# Patient Record
Sex: FEMALE | Race: WHITE | Employment: OTHER | URBAN - METROPOLITAN AREA
[De-identification: names, ages, dates, MRNs, and addresses within clinical notes are randomized per-mention and may not be internally consistent; named-entity substitution may affect disease eponyms.]

---

## 2018-06-11 ENCOUNTER — HOSPITAL ENCOUNTER (EMERGENCY)
Facility: HOSPITAL | Age: 63
Discharge: HOME | End: 2018-06-11
Attending: EMERGENCY MEDICINE
Payer: MEDICARE

## 2018-06-11 VITALS
DIASTOLIC BLOOD PRESSURE: 70 MMHG | OXYGEN SATURATION: 97 % | WEIGHT: 143 LBS | RESPIRATION RATE: 18 BRPM | BODY MASS INDEX: 26.31 KG/M2 | HEIGHT: 62 IN | HEART RATE: 75 BPM | SYSTOLIC BLOOD PRESSURE: 140 MMHG | TEMPERATURE: 97 F

## 2018-06-11 DIAGNOSIS — N12 PYELONEPHRITIS: Primary | ICD-10-CM

## 2018-06-11 PROCEDURE — 87077 CULTURE AEROBIC IDENTIFY: CPT | Performed by: EMERGENCY MEDICINE

## 2018-06-11 PROCEDURE — 99283 EMERGENCY DEPT VISIT LOW MDM: CPT

## 2018-06-11 PROCEDURE — 87086 URINE CULTURE/COLONY COUNT: CPT | Performed by: EMERGENCY MEDICINE

## 2018-06-11 PROCEDURE — 36415 COLL VENOUS BLD VENIPUNCTURE: CPT

## 2018-06-11 PROCEDURE — 85025 COMPLETE CBC W/AUTO DIFF WBC: CPT | Performed by: EMERGENCY MEDICINE

## 2018-06-11 PROCEDURE — 87186 SC STD MICRODIL/AGAR DIL: CPT | Performed by: EMERGENCY MEDICINE

## 2018-06-11 PROCEDURE — 80053 COMPREHEN METABOLIC PANEL: CPT | Performed by: EMERGENCY MEDICINE

## 2018-06-11 PROCEDURE — 81001 URINALYSIS AUTO W/SCOPE: CPT | Performed by: EMERGENCY MEDICINE

## 2018-06-11 RX ORDER — PHENAZOPYRIDINE HYDROCHLORIDE 100 MG/1
TABLET, FILM COATED ORAL 3 TIMES DAILY PRN
Qty: 10 TABLET | Refills: 0 | Status: SHIPPED | OUTPATIENT
Start: 2018-06-11 | End: 2018-06-14

## 2018-06-11 RX ORDER — CEPHALEXIN 250 MG/1
250 CAPSULE ORAL 4 TIMES DAILY
Qty: 28 CAPSULE | Refills: 0 | Status: SHIPPED | OUTPATIENT
Start: 2018-06-11 | End: 2018-06-18

## 2018-06-11 RX ORDER — CEPHALEXIN 500 MG/1
500 CAPSULE ORAL ONCE
Status: COMPLETED | OUTPATIENT
Start: 2018-06-11 | End: 2018-06-11

## 2018-06-11 RX ORDER — ACETAMINOPHEN 500 MG
1000 TABLET ORAL ONCE
Status: COMPLETED | OUTPATIENT
Start: 2018-06-11 | End: 2018-06-11

## 2018-06-11 NOTE — ED PROVIDER NOTES
Patient Seen in: [facility] Emergency Department    History   Patient presents with:  Abdomen/Flank Pain (GI/)    Stated Complaint: PATIENT STATES KIDNEY PAIN    HPI    Patient is diabetic, has had previous urinary tract infections.   She can feel comfortable, no acute distress except mildly anxious  Head and neck: Normocephalic, atraumatic, pupils equal round and reactive to light, oropharynx clear   Neck: No JVD, no lymphadenopathy, no tenderness, swelling, deformity   Cardiovascular: Regular rate Final result                 Please view results for these tests on the individual orders. URINE CULTURE, ROUTINE       ED Course as of Jun 11 1502  ------------------------------------------------------------      MDM     We will check urinalysis.   Darnell Her

## 2018-06-11 NOTE — ED INITIAL ASSESSMENT (HPI)
Pt presents with uti sxs this morning.  She c/o right flank pain and describes a pressure sensation or the urgency to void, but is incapable of producing a normal stream.

## 2018-07-13 ENCOUNTER — OFFICE VISIT (OUTPATIENT)
Dept: INTERNAL MEDICINE CLINIC | Facility: CLINIC | Age: 63
End: 2018-07-13

## 2018-07-13 VITALS
HEART RATE: 77 BPM | BODY MASS INDEX: 26.68 KG/M2 | TEMPERATURE: 98 F | WEIGHT: 145 LBS | SYSTOLIC BLOOD PRESSURE: 128 MMHG | RESPIRATION RATE: 18 BRPM | DIASTOLIC BLOOD PRESSURE: 74 MMHG | HEIGHT: 62 IN

## 2018-07-13 DIAGNOSIS — E11.9 TYPE 2 DIABETES MELLITUS WITHOUT COMPLICATION, WITHOUT LONG-TERM CURRENT USE OF INSULIN (HCC): ICD-10-CM

## 2018-07-13 DIAGNOSIS — R74.8 ELEVATED LIVER ENZYMES: ICD-10-CM

## 2018-07-13 DIAGNOSIS — Z12.11 SCREENING FOR MALIGNANT NEOPLASM OF COLON: ICD-10-CM

## 2018-07-13 DIAGNOSIS — Z12.31 VISIT FOR SCREENING MAMMOGRAM: ICD-10-CM

## 2018-07-13 DIAGNOSIS — Z00.00 ENCOUNTER FOR ANNUAL HEALTH EXAMINATION: Primary | ICD-10-CM

## 2018-07-13 LAB
CARTRIDGE LOT#: 843 NUMERIC
HEMOGLOBIN A1C: 7.6 % (ref 4.3–5.6)

## 2018-07-13 PROCEDURE — 99203 OFFICE O/P NEW LOW 30 MIN: CPT | Performed by: FAMILY MEDICINE

## 2018-07-13 PROCEDURE — G0402 INITIAL PREVENTIVE EXAM: HCPCS | Performed by: FAMILY MEDICINE

## 2018-07-13 PROCEDURE — 83036 HEMOGLOBIN GLYCOSYLATED A1C: CPT | Performed by: FAMILY MEDICINE

## 2018-07-13 RX ORDER — GLIMEPIRIDE 2 MG/1
6 TABLET ORAL
Qty: 270 TABLET | Refills: 0 | Status: SHIPPED | OUTPATIENT
Start: 2018-07-13 | End: 2018-10-23

## 2018-07-13 NOTE — PROGRESS NOTES
HPI:   Nadia Baca is a 70year old female who presents for a Medicare Initial Preventative Physical Exam (Welcome to Medicare- < 12 months on Medicare). Here for annual check-up and to establish care.  Patient hasn't had health insurance for a f (diabetes mellitus) ([address]     Dyslipidemia     Chest pain     Type 2 diabetes mellitus without complication ([address]    Wt Readings from Last 3 Encounters:  07/13/18 : 145 lb  06/11/18 : 143 lb  06/08/15 : 143 lb 1.3 oz     Last Cholesterol Labs:     Lab Resu pain or ST  LUNGS: denies shortness of breath with exertion  CARDIOVASCULAR: denies chest pain on exertion  GI: denies abdominal pain, denies heartburn  : denies dysuria, vaginal discharge or itching, no complaint of urinary incontinence   MUSCULOSKELETA patient. ASSESSMENT AND OTHER RELEVANT CHRONIC CONDITIONS:   Fernando Sales is a 70year old female who presents for a Medicare Assessment.      PLAN SUMMARY:   Diagnoses and all orders for this visit:    Encounter for annual health examination    Ty HEMOGLOBIN A1C (%)   Date Value   07/13/2018 7.6 (A)    No flowsheet data found.     Fasting Blood Sugar (FSB)Annually   Glucose (mg/dL)   Date Value   06/11/2018 217 (H)   ----------  GLUCOSE (mg/dL)   Date Value   06/02/2013 153 (H)   ----------       Car Hepatitis B for Moderate/High Risk No vaccine history found Medium/high risk factors:   End-stage renal disease   Hemophiliacs who received Factor VIII or IX concentrates   Clients of institutions for the mentally retarded   Persons who live in the [facility]

## 2018-07-13 NOTE — PATIENT INSTRUCTIONS
David Figueroa's SCREENING SCHEDULE   Tests on this list are recommended by your physician but may not be covered, or covered at this frequency, by your insurer. Please check with your insurance carrier before scheduling to verify coverage.    Princess Decree patients who meet one of the following criteria:   • Men who are 73-68 years old and have smoked more than 100 cigarettes in their lifetime   • Anyone with a family history    Colorectal Cancer Screening  Covered up to Age 76     Colonoscopy Screen   Cover this or any previous visit. Please get every year    Pneumococcal 13 (Prevnar)  Covered Once after 65 No orders found for this or any previous visit.  Please get once after your 65th birthday    Pneumococcal 23 (Pneumovax)  Covered Once after 65 No orders f results. Have mammogram done.

## 2018-08-13 ENCOUNTER — HOSPITAL ENCOUNTER (OUTPATIENT)
Dept: MAMMOGRAPHY | Facility: HOSPITAL | Age: 63
Discharge: HOME | End: 2018-08-13
Attending: FAMILY MEDICINE
Payer: MEDICARE

## 2018-08-13 DIAGNOSIS — Z12.31 VISIT FOR SCREENING MAMMOGRAM: ICD-10-CM

## 2018-08-13 PROCEDURE — 77067 SCR MAMMO BI INCL CAD: CPT | Performed by: FAMILY MEDICINE

## 2018-08-13 PROCEDURE — 77063 BREAST TOMOSYNTHESIS BI: CPT | Performed by: FAMILY MEDICINE

## 2018-10-03 DIAGNOSIS — E11.9 TYPE 2 DIABETES MELLITUS WITHOUT COMPLICATION, WITHOUT LONG-TERM CURRENT USE OF INSULIN (HCC): ICD-10-CM

## 2018-10-03 NOTE — TELEPHONE ENCOUNTER
Please call patient. She is due for f/u office visit for diabetes- 30 mins. Let her know we will check her fingerstick A1c in the office. She doesn't need to fast for this.

## 2018-10-03 NOTE — TELEPHONE ENCOUNTER
E request  Medication(s) to Refill:   Requested Prescriptions     Pending Prescriptions Disp Refills   • METFORMIN HCL 1000 MG Oral Tab [Pharmacy Med Name: METFORMIN 1000MG TAB] 90 tablet 0     Sig: TAKE 1 TABLET BY MOUTH TWICE DAILY WITH MEALS         Shona Boas

## 2018-10-19 ENCOUNTER — APPOINTMENT (OUTPATIENT)
Dept: LAB | Age: 63
End: 2018-10-19
Attending: FAMILY MEDICINE
Payer: MEDICARE

## 2018-10-19 DIAGNOSIS — E11.9 TYPE 2 DIABETES MELLITUS WITHOUT COMPLICATION, WITHOUT LONG-TERM CURRENT USE OF INSULIN (HCC): ICD-10-CM

## 2018-10-19 DIAGNOSIS — E11.9 TYPE 2 DIABETES MELLITUS WITHOUT COMPLICATION, WITHOUT LONG-TERM CURRENT USE OF INSULIN (HCC): Primary | ICD-10-CM

## 2018-10-19 PROCEDURE — 83036 HEMOGLOBIN GLYCOSYLATED A1C: CPT

## 2018-10-19 PROCEDURE — 80053 COMPREHEN METABOLIC PANEL: CPT

## 2018-10-23 ENCOUNTER — OFFICE VISIT (OUTPATIENT)
Dept: INTERNAL MEDICINE CLINIC | Facility: CLINIC | Age: 63
End: 2018-10-23

## 2018-10-23 ENCOUNTER — TELEPHONE (OUTPATIENT)
Dept: INTERNAL MEDICINE CLINIC | Facility: CLINIC | Age: 63
End: 2018-10-23

## 2018-10-23 VITALS
RESPIRATION RATE: 16 BRPM | HEART RATE: 70 BPM | SYSTOLIC BLOOD PRESSURE: 124 MMHG | BODY MASS INDEX: 27 KG/M2 | DIASTOLIC BLOOD PRESSURE: 70 MMHG | WEIGHT: 145 LBS | TEMPERATURE: 98 F

## 2018-10-23 DIAGNOSIS — J02.9 PHARYNGITIS, UNSPECIFIED ETIOLOGY: ICD-10-CM

## 2018-10-23 DIAGNOSIS — E11.9 TYPE 2 DIABETES MELLITUS WITHOUT COMPLICATION, WITHOUT LONG-TERM CURRENT USE OF INSULIN (HCC): Primary | ICD-10-CM

## 2018-10-23 PROCEDURE — 87880 STREP A ASSAY W/OPTIC: CPT | Performed by: FAMILY MEDICINE

## 2018-10-23 PROCEDURE — 99214 OFFICE O/P EST MOD 30 MIN: CPT | Performed by: FAMILY MEDICINE

## 2018-10-23 RX ORDER — PIOGLITAZONEHYDROCHLORIDE 15 MG/1
15 TABLET ORAL DAILY
Qty: 90 TABLET | Refills: 0 | Status: SHIPPED | OUTPATIENT
Start: 2018-10-23 | End: 2018-10-24

## 2018-10-23 RX ORDER — GLIMEPIRIDE 2 MG/1
6 TABLET ORAL
Qty: 270 TABLET | Refills: 0 | Status: SHIPPED | OUTPATIENT
Start: 2018-10-23 | End: 2019-02-01

## 2018-10-23 NOTE — PATIENT INSTRUCTIONS
Start Metformin 1000mg in the morning, 500mg at noon, 500mg with dinner. Start Actos and take daily. Continue Glimepiride 6mg daily. Check your A1c in 3 months.

## 2018-10-23 NOTE — TELEPHONE ENCOUNTER
Called pharmacy to check if PA is required for Pioglitazone HCl 15 MG Oral Tab, per pharmacist it is going through insurance but 90 day supply still costs $285.34. Reports pt is in the donut hole right now.   AMS- do we have samples to provide to pt during

## 2018-10-23 NOTE — TELEPHONE ENCOUNTER
Pt was just seen today-AMS sent in rx for Pioglitazone HCl 15 MG Oral Tab and it cost $285-is there another med you can send in for her?  Call to advise

## 2018-10-24 NOTE — TELEPHONE ENCOUNTER
Called pt to advise that new rx - Januvia - was sent to replace Pioglitazone. Left VM (Ok per HIPAA) explaining and to call our office with any cost issues or questions about med.

## 2018-10-24 NOTE — TELEPHONE ENCOUNTER
Rx sent. Please call patient to let her know- and remind her to call us again if there are any issues with this med.

## 2018-10-25 ENCOUNTER — TELEPHONE (OUTPATIENT)
Dept: INTERNAL MEDICINE CLINIC | Facility: CLINIC | Age: 63
End: 2018-10-25

## 2018-10-25 NOTE — PROGRESS NOTES
HPI:    Patient ID: Lenny Courtney is a 70year old female. HPI Here for f/u on diabetes. Has been taking Metformin 1000mg BID and Glimepiride 6mg daily. States her morning sugars are \"always\" high. Has had sore throat for about 4 days. No fever. for activity change, appetite change, fatigue and fever. HENT: Negative for ear pain, hearing loss and rhinorrhea. Eyes: Negative for visual disturbance. Respiratory: Negative for cough and shortness of breath.     Cardiovascular: Negative for chest Normal range of motion. Neck supple. Cardiovascular: Normal rate, regular rhythm and normal heart sounds. Pulmonary/Chest: Effort normal and breath sounds normal.   Skin: Skin is warm and dry. Vitals reviewed.              ASSESSMENT/PLAN:   Type 2 di

## 2018-10-25 NOTE — TELEPHONE ENCOUNTER
Please call patient. We could try insulin- that would be covered by her insurance. Or there is still another oral med we could try that would likely be covered by her insurance.  It is important that we do something to get her blood sugars down since her cu

## 2018-10-25 NOTE — TELEPHONE ENCOUNTER
FYI to AMS;  AMS prescribed a medication that was $335 and so AMS sent in another medication which was $1300. Patient cancelled both at [facility]. Patient just wanted AMS to know that she is not getting the prescriptions and will be out of town.   She will

## 2018-10-29 NOTE — TELEPHONE ENCOUNTER
LMTCB whether she is interested in trying insulin or oral med for blood sugar control. Also sent [facility] message.

## 2018-11-02 NOTE — TELEPHONE ENCOUNTER
#2 VM - LMTCB to discuss if she wants to try insulin or different oral medication for blood sugar control.

## 2018-11-07 NOTE — TELEPHONE ENCOUNTER
AMS- ok to send letter with instructions since pt has been unreachable via mychart and phone? Please advise, thanks.

## 2018-11-09 NOTE — TELEPHONE ENCOUNTER
Certified letter mailed with AMS instructions to home address on file. FYI to AMS.     [address]

## 2018-11-28 ENCOUNTER — OFFICE VISIT (OUTPATIENT)
Dept: OBGYN CLINIC | Facility: CLINIC | Age: 63
End: 2018-11-28
Payer: MEDICARE

## 2018-11-28 VITALS
WEIGHT: 141 LBS | HEIGHT: 62 IN | SYSTOLIC BLOOD PRESSURE: 126 MMHG | BODY MASS INDEX: 25.95 KG/M2 | DIASTOLIC BLOOD PRESSURE: 72 MMHG | HEART RATE: 72 BPM

## 2018-11-28 DIAGNOSIS — Z01.419 WELL WOMAN EXAM WITH ROUTINE GYNECOLOGICAL EXAM: Primary | ICD-10-CM

## 2018-11-28 DIAGNOSIS — Z12.4 PAPANICOLAOU SMEAR FOR CERVICAL CANCER SCREENING: ICD-10-CM

## 2018-11-28 DIAGNOSIS — Z12.39 SCREENING FOR MALIGNANT NEOPLASM OF BREAST: ICD-10-CM

## 2018-11-28 PROCEDURE — G0438 PPPS, INITIAL VISIT: HCPCS | Performed by: OBSTETRICS & GYNECOLOGY

## 2018-11-28 NOTE — PROGRESS NOTES
Aly Aguilar is a 70year old female  No LMP recorded. Patient has had a hysterectomy. Patient presents with:  Gyn Problem: numbness and sharp pain along with pressure vaginal area  .      Had numbness secondary to diabetes however for 3 weeks s worry: Not on file      Food insecurity - inability: Not on file      Transportation needs - medical: Not on file      Transportation needs - non-medical: Not on file    Occupational History      Not on file    Tobacco Use      Smoking status: Never Smoker cyanosis  Psychiatric:  Oriented to time, place, person and situation. Appropriate mood and affect    Pelvic Exam:  External Genitalia: normal appearance, hair distribution, and no lesions no obvious hernias.   Urethral Meatus:  normal in size, location, wi

## 2018-12-06 ENCOUNTER — OFFICE VISIT (OUTPATIENT)
Dept: INTERNAL MEDICINE CLINIC | Facility: CLINIC | Age: 63
End: 2018-12-06
Payer: MEDICARE

## 2018-12-06 VITALS
RESPIRATION RATE: 16 BRPM | WEIGHT: 140 LBS | BODY MASS INDEX: 26 KG/M2 | SYSTOLIC BLOOD PRESSURE: 122 MMHG | HEART RATE: 70 BPM | TEMPERATURE: 98 F | DIASTOLIC BLOOD PRESSURE: 72 MMHG

## 2018-12-06 DIAGNOSIS — M89.9 PUBIC BONE PAIN: Primary | ICD-10-CM

## 2018-12-06 PROCEDURE — 99213 OFFICE O/P EST LOW 20 MIN: CPT | Performed by: FAMILY MEDICINE

## 2018-12-08 PROBLEM — Z01.419 WELL WOMAN EXAM WITH ROUTINE GYNECOLOGICAL EXAM: Status: RESOLVED | Noted: 2018-11-28 | Resolved: 2018-12-08

## 2018-12-08 NOTE — PROGRESS NOTES
HPI:    Patient ID: Darryle Spiro is a 70year old female. HPI Here with c/o pain at pubic bone for about one week. No injury. Worse with certain movements. Pain is getting better. Hasn't tried anything for this.      Review of Systems   Constitution

## 2018-12-10 ENCOUNTER — HOSPITAL ENCOUNTER (OUTPATIENT)
Dept: GENERAL RADIOLOGY | Age: 63
Discharge: HOME | End: 2018-12-10
Attending: FAMILY MEDICINE
Payer: MEDICARE

## 2018-12-10 DIAGNOSIS — M89.9 PUBIC BONE PAIN: ICD-10-CM

## 2018-12-10 PROCEDURE — 72190 X-RAY EXAM OF PELVIS: CPT | Performed by: FAMILY MEDICINE

## 2019-02-01 ENCOUNTER — OFFICE VISIT (OUTPATIENT)
Dept: INTERNAL MEDICINE CLINIC | Facility: CLINIC | Age: 64
End: 2019-02-01
Payer: MEDICARE

## 2019-02-01 VITALS
TEMPERATURE: 98 F | BODY MASS INDEX: 26.13 KG/M2 | WEIGHT: 142 LBS | DIASTOLIC BLOOD PRESSURE: 82 MMHG | SYSTOLIC BLOOD PRESSURE: 126 MMHG | RESPIRATION RATE: 16 BRPM | HEART RATE: 72 BPM | HEIGHT: 62 IN

## 2019-02-01 DIAGNOSIS — E11.9 TYPE 2 DIABETES MELLITUS WITHOUT COMPLICATION, WITHOUT LONG-TERM CURRENT USE OF INSULIN (HCC): Primary | ICD-10-CM

## 2019-02-01 LAB
CARTRIDGE EXPIRATION DATE: 2020 DATE
CARTRIDGE LOT#: 918 NUMERIC

## 2019-02-01 PROCEDURE — 83036 HEMOGLOBIN GLYCOSYLATED A1C: CPT | Performed by: FAMILY MEDICINE

## 2019-02-01 PROCEDURE — 99213 OFFICE O/P EST LOW 20 MIN: CPT | Performed by: FAMILY MEDICINE

## 2019-02-01 RX ORDER — GLIMEPIRIDE 2 MG/1
6 TABLET ORAL
Qty: 270 TABLET | Refills: 1 | Status: SHIPPED | OUTPATIENT
Start: 2019-02-01 | End: 2019-07-30

## 2019-02-01 NOTE — PROGRESS NOTES
HPI:    Patient ID: Shreyas Fenton is a 70year old female. HPI Here for f/u on diabetes. Has been trying to eat healthier and exercise regularly. Checks sugars periodically and sugars have improved. No complaints today.        Review of Systems   Con

## 2019-02-20 ENCOUNTER — TELEPHONE (OUTPATIENT)
Dept: INTERNAL MEDICINE CLINIC | Facility: CLINIC | Age: 64
End: 2019-02-20

## 2019-02-20 DIAGNOSIS — E11.9 TYPE 2 DIABETES MELLITUS WITHOUT COMPLICATION, WITHOUT LONG-TERM CURRENT USE OF INSULIN (HCC): Primary | ICD-10-CM

## 2019-02-20 RX ORDER — BLOOD-GLUCOSE METER
EACH MISCELLANEOUS
Qty: 1 KIT | Refills: 0 | Status: SHIPPED | OUTPATIENT
Start: 2019-02-20 | End: 2019-02-25

## 2019-02-20 RX ORDER — BLOOD SUGAR DIAGNOSTIC
STRIP MISCELLANEOUS
Qty: 100 STRIP | Refills: 1 | Status: SHIPPED | OUTPATIENT
Start: 2019-02-20 | End: 2019-02-25

## 2019-02-20 NOTE — TELEPHONE ENCOUNTER
Please call patient. She dropped off a note to send Rxs for a new Accucheck brand meter. Let her know I sent the Rxs- one for meter and lancets, and test strips.

## 2019-02-21 NOTE — TELEPHONE ENCOUNTER
LM for pt at [phone] (M)vm, new scripts sent in for Accucheck brand meter, lancets and test strips to [facility] on file and to call back if any questions.

## 2019-02-25 RX ORDER — BLOOD SUGAR DIAGNOSTIC
STRIP MISCELLANEOUS
Qty: 100 STRIP | Refills: 1 | Status: SHIPPED | OUTPATIENT
Start: 2019-02-25 | End: 2019-07-30

## 2019-02-25 RX ORDER — BLOOD-GLUCOSE METER
EACH MISCELLANEOUS
Qty: 1 KIT | Refills: 0 | Status: SHIPPED | OUTPATIENT
Start: 2019-02-25

## 2019-02-25 NOTE — TELEPHONE ENCOUNTER
Rodriguez Martinez from [place] called stated she received strips and lancets without dx. Medicare will not cover, will needs these 2 recent to include dx.  Please advise

## 2019-03-11 ENCOUNTER — TELEPHONE (OUTPATIENT)
Dept: INTERNAL MEDICINE CLINIC | Facility: CLINIC | Age: 64
End: 2019-03-11

## 2019-03-11 NOTE — TELEPHONE ENCOUNTER
Abstracted DM foot exam.  Results to [facility] bin for review. Then send to scanning.

## 2019-05-21 ENCOUNTER — TELEPHONE (OUTPATIENT)
Dept: INTERNAL MEDICINE CLINIC | Facility: CLINIC | Age: 64
End: 2019-05-21

## 2019-05-22 ENCOUNTER — TELEPHONE (OUTPATIENT)
Dept: INTERNAL MEDICINE CLINIC | Facility: CLINIC | Age: 64
End: 2019-05-22

## 2019-05-22 NOTE — TELEPHONE ENCOUNTER
Called pt left a VM to let her know she is due for eye exam and for physical to call back to let us know if she got it done yet or if she wants to schedule an appt for her medicare wellness.

## 2019-05-23 RX ORDER — LANCETS
1 EACH MISCELLANEOUS 2 TIMES DAILY
Qty: 200 EACH | Refills: 3 | Status: SHIPPED | OUTPATIENT
Start: 2019-05-23 | End: 2019-07-30

## 2019-05-23 NOTE — TELEPHONE ENCOUNTER
Prescription Refill Request - Patient advised can take 48-72 hours.     Name of Medication (strength, dose, qty requested:  Accuchek Fastclix     Which pharmacy:[facility]    Have we prescribed before:Yes  (If yes, please make explain to patient that future re

## 2019-05-23 NOTE — TELEPHONE ENCOUNTER
LOV: 2/1/19 w/ AMS for Diabetes  FOV: None  Last labs: 2/1/19 A1C  Last Refill: None    Per protocol routed to provider

## 2019-05-24 ENCOUNTER — TELEPHONE (OUTPATIENT)
Dept: INTERNAL MEDICINE CLINIC | Facility: CLINIC | Age: 64
End: 2019-05-24

## 2019-05-24 NOTE — TELEPHONE ENCOUNTER
Received DM eye exam from [facility] 3/4/19, no retinopathy. Abstracted results. Placed in AMS bin. Once reviewed, will send to scanning.

## 2019-06-18 ENCOUNTER — TELEPHONE (OUTPATIENT)
Dept: INTERNAL MEDICINE CLINIC | Facility: CLINIC | Age: 64
End: 2019-06-18

## 2019-06-18 NOTE — TELEPHONE ENCOUNTER
Spoke with patient. Patient states she is doing very well with her DM. Patient will call in July to schedule her Medicare Wellness.

## 2019-07-30 ENCOUNTER — OFFICE VISIT (OUTPATIENT)
Dept: INTERNAL MEDICINE CLINIC | Facility: CLINIC | Age: 64
End: 2019-07-30
Payer: MEDICARE

## 2019-07-30 VITALS
OXYGEN SATURATION: 98 % | SYSTOLIC BLOOD PRESSURE: 130 MMHG | BODY MASS INDEX: 25.91 KG/M2 | DIASTOLIC BLOOD PRESSURE: 74 MMHG | WEIGHT: 140.81 LBS | RESPIRATION RATE: 20 BRPM | TEMPERATURE: 99 F | HEART RATE: 76 BPM | HEIGHT: 62 IN

## 2019-07-30 DIAGNOSIS — Z12.31 VISIT FOR SCREENING MAMMOGRAM: ICD-10-CM

## 2019-07-30 DIAGNOSIS — E11.9 TYPE 2 DIABETES MELLITUS WITHOUT COMPLICATION, WITHOUT LONG-TERM CURRENT USE OF INSULIN (HCC): Primary | ICD-10-CM

## 2019-07-30 DIAGNOSIS — E78.5 DYSLIPIDEMIA: ICD-10-CM

## 2019-07-30 DIAGNOSIS — R74.8 ELEVATED LIVER ENZYMES: ICD-10-CM

## 2019-07-30 LAB
CARTRIDGE EXPIRATION DATE: 2021 DATE
CARTRIDGE LOT#: 981 NUMERIC
HEMOGLOBIN A1C: 6.9 % (ref 4.3–5.6)

## 2019-07-30 PROCEDURE — 99214 OFFICE O/P EST MOD 30 MIN: CPT | Performed by: FAMILY MEDICINE

## 2019-07-30 PROCEDURE — 83036 HEMOGLOBIN GLYCOSYLATED A1C: CPT | Performed by: FAMILY MEDICINE

## 2019-07-30 RX ORDER — GLIMEPIRIDE 2 MG/1
4 TABLET ORAL
Qty: 180 TABLET | Refills: 1 | Status: SHIPPED | OUTPATIENT
Start: 2019-07-30 | End: 2020-01-10

## 2019-07-30 RX ORDER — BLOOD SUGAR DIAGNOSTIC
STRIP MISCELLANEOUS
Qty: 100 STRIP | Refills: 1 | Status: SHIPPED | OUTPATIENT
Start: 2019-07-30 | End: 2020-01-16

## 2019-07-30 RX ORDER — LANCETS
1 EACH MISCELLANEOUS 2 TIMES DAILY
Qty: 200 EACH | Refills: 3 | Status: SHIPPED | OUTPATIENT
Start: 2019-07-30 | End: 2020-04-01

## 2019-07-30 RX ORDER — BIOTIN 1 MG
1 TABLET ORAL DAILY
COMMUNITY

## 2019-07-30 NOTE — PATIENT INSTRUCTIONS
Get your blood tests done. This will need to be done fasting for about 8 hours prior and will include a urine test. We will call you once we get the results. Go ahead and decrease your Glimepiride to 4mg in the morning.      Exercise to Manage Your Bloo A pedometer is a small device that keeps track of how many steps you take. You can clip it to your belt (or a strap on your arm or leg) and go about your daily routine.  \"Smartphones\" now also have apps to record your walking. At the end of the day, the amber Physical activity is important when you have diabetes. But you need to keep an eye on your blood sugar level. Check often if you have been active for longer than usual, or if the activity was unplanned.  Make it a habit to check your blood sugar before bein

## 2019-07-30 NOTE — PROGRESS NOTES
HPI:    Patient ID: Lenny Courtney is a 75year old female. HPI  Here for f/u on diabetes. Patient has been continuing to check her blood sugars a couple of times per day.  Does note fasting sugars can be in the 60s sometimes and is wondering if she c VOMITING  Ace Inhibitors          Coughing  Prednisone              RASH    Comment:Pt. States her left arm and hand became numb  Sulfa Antibiotics       RASH   PHYSICAL EXAM:   Physical Exam   Constitutional: She appears well-developed and well-nourished.

## 2019-08-15 NOTE — TELEPHONE ENCOUNTER
Patient called back re: phone call to her to set up her Medicare Wellness. Patient states that the last visit in July she and Dr. Rubina Muñiz discussed all that needed to be discussed.   Patient wants to know if she really has to come in again for a medicare

## 2019-08-15 NOTE — TELEPHONE ENCOUNTER
LOV 7/30/19 with AMS. Copied ov notes:      ASSESSMENT/PLAN:   Type 2 diabetes mellitus without complication, without long-term current use of insulin (hcc)  (primary encounter diagnosis)  Dyslipidemia  Elevated liver enzymes  1.  A1c at goal. [address] [address]

## 2019-08-16 NOTE — TELEPHONE ENCOUNTER
Called pt back. Left detailed message (Virgen Drew per HIPAA) informing pt just to complete ordered labs from July and to fast 10-12 hrs beforehand. No appt is necessary. Advised pt to call office with any questions.

## 2019-08-23 ENCOUNTER — APPOINTMENT (OUTPATIENT)
Dept: LAB | Age: 64
End: 2019-08-23
Attending: FAMILY MEDICINE
Payer: MEDICARE

## 2019-08-23 DIAGNOSIS — E78.5 DYSLIPIDEMIA: ICD-10-CM

## 2019-08-23 DIAGNOSIS — R74.8 ELEVATED LIVER ENZYMES: ICD-10-CM

## 2019-08-23 DIAGNOSIS — E11.9 TYPE 2 DIABETES MELLITUS WITHOUT COMPLICATION, WITHOUT LONG-TERM CURRENT USE OF INSULIN (HCC): ICD-10-CM

## 2019-08-23 LAB
ALBUMIN SERPL-MCNC: 3.6 G/DL (ref 3.4–5)
ALBUMIN/GLOB SERPL: 1.1 {RATIO} (ref 1–2)
ALP LIVER SERPL-CCNC: 70 U/L (ref 55–142)
ALT SERPL-CCNC: 32 U/L (ref 13–56)
ANION GAP SERPL CALC-SCNC: 6 MMOL/L (ref 0–18)
AST SERPL-CCNC: 23 U/L (ref 15–37)
BILIRUB SERPL-MCNC: 0.6 MG/DL (ref 0.1–2)
BUN BLD-MCNC: 9 MG/DL (ref 7–18)
BUN/CREAT SERPL: 11.5 (ref 10–20)
CALCIUM BLD-MCNC: 8.7 MG/DL (ref 8.5–10.1)
CHLORIDE SERPL-SCNC: 108 MMOL/L (ref 98–112)
CHOLEST SMN-MCNC: 241 MG/DL (ref ?–200)
CO2 SERPL-SCNC: 28 MMOL/L (ref 21–32)
CREAT BLD-MCNC: 0.78 MG/DL (ref 0.55–1.02)
CREAT UR-SCNC: 140 MG/DL
GLOBULIN PLAS-MCNC: 3.2 G/DL (ref 2.8–4.4)
GLUCOSE BLD-MCNC: 160 MG/DL (ref 70–99)
HDLC SERPL-MCNC: 45 MG/DL (ref 40–59)
LDLC SERPL CALC-MCNC: 172 MG/DL (ref ?–100)
M PROTEIN MFR SERPL ELPH: 6.8 G/DL (ref 6.4–8.2)
MICROALBUMIN UR-MCNC: 1.03 MG/DL
MICROALBUMIN/CREAT 24H UR-RTO: 7.4 UG/MG (ref ?–30)
NONHDLC SERPL-MCNC: 196 MG/DL (ref ?–130)
OSMOLALITY SERPL CALC.SUM OF ELEC: 296 MOSM/KG (ref 275–295)
POTASSIUM SERPL-SCNC: 4.7 MMOL/L (ref 3.5–5.1)
SODIUM SERPL-SCNC: 142 MMOL/L (ref 136–145)
TRIGL SERPL-MCNC: 121 MG/DL (ref 30–149)
VLDLC SERPL CALC-MCNC: 24 MG/DL (ref 0–30)

## 2019-08-23 PROCEDURE — 80061 LIPID PANEL: CPT

## 2019-08-23 PROCEDURE — 82570 ASSAY OF URINE CREATININE: CPT

## 2019-08-23 PROCEDURE — 82043 UR ALBUMIN QUANTITATIVE: CPT

## 2019-08-23 PROCEDURE — 80053 COMPREHEN METABOLIC PANEL: CPT

## 2019-08-30 ENCOUNTER — HOSPITAL ENCOUNTER (OUTPATIENT)
Dept: MAMMOGRAPHY | Age: 64
Discharge: HOME | End: 2019-08-30
Attending: FAMILY MEDICINE
Payer: MEDICARE

## 2019-08-30 DIAGNOSIS — Z12.31 VISIT FOR SCREENING MAMMOGRAM: ICD-10-CM

## 2019-08-30 PROCEDURE — 77067 SCR MAMMO BI INCL CAD: CPT | Performed by: FAMILY MEDICINE

## 2019-08-30 PROCEDURE — 77063 BREAST TOMOSYNTHESIS BI: CPT | Performed by: FAMILY MEDICINE

## 2019-10-01 ENCOUNTER — TELEPHONE (OUTPATIENT)
Dept: INTERNAL MEDICINE CLINIC | Facility: CLINIC | Age: 64
End: 2019-10-01

## 2019-10-06 ENCOUNTER — HOSPITAL ENCOUNTER (EMERGENCY)
Facility: HOSPITAL | Age: 64
Discharge: HOME | End: 2019-10-06
Attending: EMERGENCY MEDICINE
Payer: MEDICARE

## 2019-10-06 VITALS
HEART RATE: 75 BPM | TEMPERATURE: 99 F | WEIGHT: 133 LBS | HEIGHT: 62 IN | BODY MASS INDEX: 24.48 KG/M2 | RESPIRATION RATE: 17 BRPM | DIASTOLIC BLOOD PRESSURE: 79 MMHG | SYSTOLIC BLOOD PRESSURE: 130 MMHG | OXYGEN SATURATION: 98 %

## 2019-10-06 DIAGNOSIS — B02.9 HERPES ZOSTER WITHOUT COMPLICATION: Primary | ICD-10-CM

## 2019-10-06 PROCEDURE — 99283 EMERGENCY DEPT VISIT LOW MDM: CPT

## 2019-10-06 RX ORDER — PREDNISONE 1 MG/1
TABLET ORAL
Qty: 13 TABLET | Refills: 0 | Status: SHIPPED | OUTPATIENT
Start: 2019-10-06 | End: 2019-12-20

## 2019-10-06 RX ORDER — FAMCICLOVIR 500 MG/1
500 TABLET, FILM COATED ORAL 3 TIMES DAILY
Qty: 21 TABLET | Refills: 0 | Status: SHIPPED | OUTPATIENT
Start: 2019-10-06 | End: 2019-10-13

## 2019-10-06 RX ORDER — PREDNISONE 20 MG/1
40 TABLET ORAL DAILY
Qty: 10 TABLET | Refills: 0 | Status: SHIPPED | OUTPATIENT
Start: 2019-10-06 | End: 2019-10-11

## 2019-10-07 NOTE — ED PROVIDER NOTES
Patient Seen in: [facility] Emergency Department      History   Patient presents with:  Pain (neurologic)    Stated Complaint: L leg pain     HPI    Patient complains of leg pain that started yesterday.   Patient describes an area of burning discomfo (Room air)       Current:/79   Pulse 75   Temp 98.5 °F (36.9 °C) (Temporal)   Resp 17   Ht 157.5 cm (5' 2\")   Wt 60.3 kg   SpO2 98%   BMI 24.33 kg/m²         Physical Exam  General: The patient is awake, alert, conversant.   Patient answers questions outbreak.       Disposition and Plan     Clinical Impression:  Herpes zoster without complication  (primary encounter diagnosis)    Disposition:  Discharge  10/6/2019  9:02 pm    Follow-up:  Chelo Barrett, DO  [address], [address] [facility]

## 2019-10-07 NOTE — ED INITIAL ASSESSMENT (HPI)
Working in garage and felt a sting in her left buttox radiating across and down the back of her leg. Pain has been worsening X 3 days.

## 2019-10-11 ENCOUNTER — OFFICE VISIT (OUTPATIENT)
Dept: INTERNAL MEDICINE CLINIC | Facility: CLINIC | Age: 64
End: 2019-10-11
Payer: MEDICARE

## 2019-10-11 VITALS
RESPIRATION RATE: 16 BRPM | WEIGHT: 137.5 LBS | TEMPERATURE: 98 F | HEIGHT: 62 IN | SYSTOLIC BLOOD PRESSURE: 136 MMHG | BODY MASS INDEX: 25.3 KG/M2 | HEART RATE: 82 BPM | DIASTOLIC BLOOD PRESSURE: 68 MMHG

## 2019-10-11 DIAGNOSIS — B02.9 HERPES ZOSTER WITHOUT COMPLICATION: Primary | ICD-10-CM

## 2019-10-11 DIAGNOSIS — E78.5 DYSLIPIDEMIA: ICD-10-CM

## 2019-10-11 DIAGNOSIS — E11.9 TYPE 2 DIABETES MELLITUS WITHOUT COMPLICATION, WITHOUT LONG-TERM CURRENT USE OF INSULIN (HCC): ICD-10-CM

## 2019-10-11 PROCEDURE — 99214 OFFICE O/P EST MOD 30 MIN: CPT | Performed by: FAMILY MEDICINE

## 2019-10-14 NOTE — PROGRESS NOTES
HPI:    Patient ID: Claudia Mcbride is a 75year old female. HPI Here for f/u from ER where she was diagnosed with shingles. Patient started to have pain in left thigh about 8 days ago.  Went to ER and at that point had some lesions and started on anti glimepiride 2 MG Oral Tab, Take 2 tablets (4 mg total) by mouth daily with breakfast., Disp: 180 tablet, Rfl: 1  ACCU-CHEK FASTCLIX LANCETS Does not apply Misc, 1 lancet by Finger stick route 2 (two) times daily.  Use as directed., Disp: 200 each, Rfl: 3  G None       [id number]

## 2019-12-20 ENCOUNTER — OFFICE VISIT (OUTPATIENT)
Dept: INTERNAL MEDICINE CLINIC | Facility: CLINIC | Age: 64
End: 2019-12-20
Payer: MEDICARE

## 2019-12-20 VITALS
HEIGHT: 62 IN | WEIGHT: 138.25 LBS | SYSTOLIC BLOOD PRESSURE: 136 MMHG | RESPIRATION RATE: 16 BRPM | DIASTOLIC BLOOD PRESSURE: 68 MMHG | TEMPERATURE: 99 F | HEART RATE: 72 BPM | BODY MASS INDEX: 25.44 KG/M2

## 2019-12-20 DIAGNOSIS — Z12.11 SCREENING FOR MALIGNANT NEOPLASM OF COLON: ICD-10-CM

## 2019-12-20 DIAGNOSIS — E11.9 TYPE 2 DIABETES MELLITUS WITHOUT COMPLICATION, WITHOUT LONG-TERM CURRENT USE OF INSULIN (HCC): Primary | ICD-10-CM

## 2019-12-20 DIAGNOSIS — E78.5 DYSLIPIDEMIA: ICD-10-CM

## 2019-12-20 PROCEDURE — 83036 HEMOGLOBIN GLYCOSYLATED A1C: CPT | Performed by: FAMILY MEDICINE

## 2019-12-20 PROCEDURE — 99214 OFFICE O/P EST MOD 30 MIN: CPT | Performed by: FAMILY MEDICINE

## 2019-12-20 NOTE — PATIENT INSTRUCTIONS
Continue current medication. Recheck your cholesterol in January and we can then talk about starting cholesterol lowering medication.

## 2019-12-22 NOTE — PROGRESS NOTES
HPI:    Patient ID: Fernando Sales is a 75year old female. HPI Here for f/u. Patient has been taking Metformin and Glimepiride as prescribed with no issues. Is very reluctant to take prescription medication and feels supplements are safer.  Is taking AND VOMITING  Ace Inhibitors          Coughing  Prednisone              RASH    Comment:Pt.  States her left arm and hand became numb  Sulfa Antibiotics       RASH   PHYSICAL EXAM:   Physical Exam   Constitutional: She appears well-developed and well-nouris

## 2020-01-07 ENCOUNTER — OFFICE VISIT (OUTPATIENT)
Dept: FAMILY MEDICINE CLINIC | Facility: CLINIC | Age: 65
End: 2020-01-07
Payer: MEDICARE

## 2020-01-07 VITALS
HEART RATE: 70 BPM | TEMPERATURE: 98 F | WEIGHT: 133 LBS | HEIGHT: 62 IN | DIASTOLIC BLOOD PRESSURE: 80 MMHG | RESPIRATION RATE: 16 BRPM | SYSTOLIC BLOOD PRESSURE: 120 MMHG | OXYGEN SATURATION: 99 % | BODY MASS INDEX: 24.48 KG/M2

## 2020-01-07 DIAGNOSIS — J02.9 PHARYNGITIS, UNSPECIFIED ETIOLOGY: Primary | ICD-10-CM

## 2020-01-07 LAB
CONTROL LINE PRESENT WITH A CLEAR BACKGROUND (YES/NO): YES YES/NO
KIT LOT #: NORMAL NUMERIC
STREP GRP A CUL-SCR: NEGATIVE

## 2020-01-07 PROCEDURE — 87081 CULTURE SCREEN ONLY: CPT | Performed by: NURSE PRACTITIONER

## 2020-01-07 PROCEDURE — 99213 OFFICE O/P EST LOW 20 MIN: CPT | Performed by: NURSE PRACTITIONER

## 2020-01-07 PROCEDURE — 87880 STREP A ASSAY W/OPTIC: CPT | Performed by: NURSE PRACTITIONER

## 2020-01-07 NOTE — PROGRESS NOTES
CHIEF COMPLAINT:   Patient presents with:  Sore Throat: x 5 days        HPI:   Rob Arguello is a 75year old female presents to clinic with complaint of sore throat. Patient has had 5 days. Symptoms have been worsening since onset.   Patient reports Smoking status: Never Smoker      Smokeless tobacco: Never Used    Alcohol use: No    Drug use: No       REVIEW OF SYSTEMS:   GENERAL HEALTH: fair appetite  SKIN: denies any unusual skin lesions or rashes  HEENT: denies ear pain, See HPI  RESPIRATORY: Requested Prescriptions      No prescriptions requested or ordered in this encounter       Throat culture to be sent: yes      Comfort measures explained and discussed as listed in Patient Instructions    Follow up with PCP in 3-5 days if not improving, co Prevention tips include the following:  · Stop smoking or reduce contact with secondhand smoke. Smoke irritates the tender throat lining. · Limit contact with pets and with allergy-causing substances, such as pollen and mold.   · When you’re around someone

## 2020-01-10 DIAGNOSIS — E11.9 TYPE 2 DIABETES MELLITUS WITHOUT COMPLICATION, WITHOUT LONG-TERM CURRENT USE OF INSULIN (HCC): ICD-10-CM

## 2020-01-10 RX ORDER — GLIMEPIRIDE 2 MG/1
TABLET ORAL
Qty: 180 TABLET | Refills: 0 | Status: SHIPPED | OUTPATIENT
Start: 2020-01-10 | End: 2020-04-01

## 2020-01-10 NOTE — TELEPHONE ENCOUNTER
LOV: 12/20/19-dm  Last CPE:7/13/18  Last refill  metFORMIN HCl 500 MG Oral Tab 360 tablet 1 7/30/2019     glimepiride 2 MG Oral Tab 180 tablet 1 7/30/2019     Last labs that are related: 12/20/19-a1c  Future appointment: None  Protocol: passed protocol; pe

## 2020-01-16 DIAGNOSIS — E11.9 TYPE 2 DIABETES MELLITUS WITHOUT COMPLICATION, WITHOUT LONG-TERM CURRENT USE OF INSULIN (HCC): ICD-10-CM

## 2020-01-16 RX ORDER — BLOOD SUGAR DIAGNOSTIC
STRIP MISCELLANEOUS
Qty: 100 STRIP | Refills: 1 | Status: SHIPPED | OUTPATIENT
Start: 2020-01-16 | End: 2020-04-01

## 2020-01-16 NOTE — TELEPHONE ENCOUNTER
LOV: 12/20/19-dm  Last CPE:7/13/18  Last refill:Glucose Blood (ACCU-CHEK GUIDE) In Vitro Strip 7/30/19  Quantity: 100 strips, 1 refills  Last labs that are related: a1c 12/20/19  Future appointment: None  Protocol: passed protocol; pend    Please approve o

## 2020-01-17 ENCOUNTER — TELEPHONE (OUTPATIENT)
Dept: INTERNAL MEDICINE CLINIC | Facility: CLINIC | Age: 65
End: 2020-01-17

## 2020-01-17 DIAGNOSIS — E11.9 TYPE 2 DIABETES MELLITUS WITHOUT COMPLICATION, WITHOUT LONG-TERM CURRENT USE OF INSULIN (HCC): ICD-10-CM

## 2020-01-17 NOTE — TELEPHONE ENCOUNTER
Fax received from [facility] requesting   Accu-chek fastclix lancets  Accu-chek guide with DX CODE    Spoke to Dilcia Meade- she cleared and updated the noted.   DX code E11.9 pt non insulin dependent

## 2020-02-13 ENCOUNTER — TELEPHONE (OUTPATIENT)
Dept: INTERNAL MEDICINE CLINIC | Facility: CLINIC | Age: 65
End: 2020-02-13

## 2020-04-01 DIAGNOSIS — E11.9 TYPE 2 DIABETES MELLITUS WITHOUT COMPLICATION, WITHOUT LONG-TERM CURRENT USE OF INSULIN (HCC): ICD-10-CM

## 2020-04-01 RX ORDER — BLOOD SUGAR DIAGNOSTIC
STRIP MISCELLANEOUS
Qty: 100 STRIP | Refills: 1 | Status: SHIPPED | OUTPATIENT
Start: 2020-04-01 | End: 2020-10-14

## 2020-04-01 RX ORDER — GLIMEPIRIDE 2 MG/1
4 TABLET ORAL
Qty: 180 TABLET | Refills: 0 | Status: SHIPPED | OUTPATIENT
Start: 2020-04-01 | End: 2020-05-22

## 2020-04-01 RX ORDER — LANCETS
1 EACH MISCELLANEOUS 2 TIMES DAILY
Qty: 200 EACH | Refills: 3 | Status: SHIPPED | OUTPATIENT
Start: 2020-04-01 | End: 2020-10-14

## 2020-04-01 NOTE — TELEPHONE ENCOUNTER
Prescription Refill Request - Patient advised can take 48-72 hours.       Name of Medication (strength, dose, qty requested:   metFORMIN HCl 500 MG Oral Tab 360 tablet 0 1/10/2020    Sig:   TAKE 2 TABLETS BY MOUTH IN THE MORNING , one tablet at noon and one

## 2020-04-01 NOTE — TELEPHONE ENCOUNTER
Last Ov: 12/20/19, AMS, DM f/u  Upcoming appt: 5/22/20, TB  Last labs: A1c 12/20/19  Last Rx:   Lancets, #200, 3R 7/30/19  Glimepiride 2mg, #180, 0R 1/10/20  Test strip, #100, 1R 1/16/20  Metformin 500mg, #360, 0R 1/10/20

## 2020-05-22 ENCOUNTER — VIRTUAL PHONE E/M (OUTPATIENT)
Dept: INTERNAL MEDICINE CLINIC | Facility: CLINIC | Age: 65
End: 2020-05-22
Payer: MEDICARE

## 2020-05-22 DIAGNOSIS — Z12.31 ENCOUNTER FOR SCREENING MAMMOGRAM FOR MALIGNANT NEOPLASM OF BREAST: ICD-10-CM

## 2020-05-22 DIAGNOSIS — E78.5 DYSLIPIDEMIA: ICD-10-CM

## 2020-05-22 DIAGNOSIS — E11.9 TYPE 2 DIABETES MELLITUS WITHOUT COMPLICATION, WITHOUT LONG-TERM CURRENT USE OF INSULIN (HCC): ICD-10-CM

## 2020-05-22 DIAGNOSIS — E78.5 MILD HYPERLIPIDEMIA: ICD-10-CM

## 2020-05-22 DIAGNOSIS — Z86.39 HISTORY OF DIABETIC NEUROPATHY: ICD-10-CM

## 2020-05-22 DIAGNOSIS — R63.4 WEIGHT LOSS: ICD-10-CM

## 2020-05-22 DIAGNOSIS — Z12.83 SCREENING FOR SKIN CANCER: ICD-10-CM

## 2020-05-22 DIAGNOSIS — E11.649 CONTROLLED TYPE 2 DIABETES MELLITUS WITH HYPOGLYCEMIA, WITHOUT LONG-TERM CURRENT USE OF INSULIN (HCC): Primary | ICD-10-CM

## 2020-05-22 DIAGNOSIS — R92.2 DENSE BREAST: ICD-10-CM

## 2020-05-22 PROCEDURE — 99443 PHONE E/M BY PHYS 21-30 MIN: CPT | Performed by: INTERNAL MEDICINE

## 2020-05-22 RX ORDER — GLIMEPIRIDE 2 MG/1
2 TABLET ORAL
Qty: 180 TABLET | Refills: 0 | COMMUNITY
Start: 2020-05-22 | End: 2020-06-30

## 2020-05-22 NOTE — PROGRESS NOTES
Due to COVID-19 ACTION PLAN, the patient's office visit was converted to a phone or video visit. Patient understands and accepts financial responsibility for any deductible, co-insurance and/or co-pays associated with this service.   Time Spent:22 min    S BILAT (CPT=77067/97381); Future    Dense breast  -     CATA MACK 2D+3D SCREENING BILAT (CPT=77067/66752);  Future    Screening for skin cancer-     DERM - INTERNAL    History of diabetic neuropathy  -     PODIATRY - INTERNAL         Return in about 6 weeks (

## 2020-06-01 ENCOUNTER — VIRTUAL PHONE E/M (OUTPATIENT)
Dept: INTERNAL MEDICINE CLINIC | Facility: CLINIC | Age: 65
End: 2020-06-01
Payer: MEDICARE

## 2020-06-01 DIAGNOSIS — Z91.09 ENVIRONMENTAL ALLERGIES: ICD-10-CM

## 2020-06-01 DIAGNOSIS — H92.03 EAR PAIN, BILATERAL: ICD-10-CM

## 2020-06-01 DIAGNOSIS — J02.9 SORE THROAT: Primary | ICD-10-CM

## 2020-06-01 PROCEDURE — 99441 PHONE E/M BY PHYS 5-10 MIN: CPT | Performed by: INTERNAL MEDICINE

## 2020-06-01 RX ORDER — AZITHROMYCIN 250 MG/1
TABLET, FILM COATED ORAL
Qty: 6 TABLET | Refills: 0 | Status: SHIPPED | OUTPATIENT
Start: 2020-06-01 | End: 2020-06-06

## 2020-06-01 NOTE — PROGRESS NOTES
Due to COVID-19 ACTION PLAN, the patient's office visit was converted to a phone visit. Patient understands and accepts financial responsibility for any deductible, co-insurance and/or co-pays associated with this service.   Time Spent: 8 min    Subjective relationship, due to the on-going public health crisis/national emergency and because of restrictions of visitation. There are limitations of this visit as no physical exam could be performed.   Every conscious effort was taken to allow for sufficient and

## 2020-06-29 ENCOUNTER — LAB ENCOUNTER (OUTPATIENT)
Dept: LAB | Age: 65
End: 2020-06-29
Attending: INTERNAL MEDICINE
Payer: MEDICARE

## 2020-06-29 DIAGNOSIS — E78.5 DYSLIPIDEMIA: ICD-10-CM

## 2020-06-29 DIAGNOSIS — E11.649 CONTROLLED TYPE 2 DIABETES MELLITUS WITH HYPOGLYCEMIA, WITHOUT LONG-TERM CURRENT USE OF INSULIN (HCC): ICD-10-CM

## 2020-06-29 DIAGNOSIS — R63.4 WEIGHT LOSS: ICD-10-CM

## 2020-06-29 LAB
ALBUMIN SERPL-MCNC: 3.9 G/DL (ref 3.4–5)
ALBUMIN/GLOB SERPL: 1.1 {RATIO} (ref 1–2)
ALP LIVER SERPL-CCNC: 59 U/L (ref 55–142)
ALT SERPL-CCNC: 42 U/L (ref 13–56)
ANION GAP SERPL CALC-SCNC: 7 MMOL/L (ref 0–18)
AST SERPL-CCNC: 21 U/L (ref 15–37)
BASOPHILS # BLD AUTO: 0.03 X10(3) UL (ref 0–0.2)
BASOPHILS NFR BLD AUTO: 0.5 %
BILIRUB SERPL-MCNC: 0.6 MG/DL (ref 0.1–2)
BUN BLD-MCNC: 13 MG/DL (ref 7–18)
BUN/CREAT SERPL: 14.8 (ref 10–20)
CALCIUM BLD-MCNC: 9.3 MG/DL (ref 8.5–10.1)
CHLORIDE SERPL-SCNC: 109 MMOL/L (ref 98–112)
CHOLEST SMN-MCNC: 220 MG/DL (ref ?–200)
CO2 SERPL-SCNC: 25 MMOL/L (ref 21–32)
CREAT BLD-MCNC: 0.88 MG/DL (ref 0.55–1.02)
CREAT UR-SCNC: 149 MG/DL
DEPRECATED RDW RBC AUTO: 41.2 FL (ref 35.1–46.3)
EOSINOPHIL # BLD AUTO: 0.06 X10(3) UL (ref 0–0.7)
EOSINOPHIL NFR BLD AUTO: 1.1 %
ERYTHROCYTE [DISTWIDTH] IN BLOOD BY AUTOMATED COUNT: 12.7 % (ref 11–15)
EST. AVERAGE GLUCOSE BLD GHB EST-MCNC: 148 MG/DL (ref 68–126)
GLOBULIN PLAS-MCNC: 3.5 G/DL (ref 2.8–4.4)
GLUCOSE BLD-MCNC: 146 MG/DL (ref 70–99)
HBA1C MFR BLD HPLC: 6.8 % (ref ?–5.7)
HCT VFR BLD AUTO: 43.8 % (ref 35–48)
HDLC SERPL-MCNC: 49 MG/DL (ref 40–59)
HGB BLD-MCNC: 14 G/DL (ref 12–16)
IMM GRANULOCYTES # BLD AUTO: 0.02 X10(3) UL (ref 0–1)
IMM GRANULOCYTES NFR BLD: 0.4 %
LDLC SERPL CALC-MCNC: 155 MG/DL (ref ?–100)
LYMPHOCYTES # BLD AUTO: 1.99 X10(3) UL (ref 1–4)
LYMPHOCYTES NFR BLD AUTO: 35.2 %
M PROTEIN MFR SERPL ELPH: 7.4 G/DL (ref 6.4–8.2)
MCH RBC QN AUTO: 28.9 PG (ref 26–34)
MCHC RBC AUTO-ENTMCNC: 32 G/DL (ref 31–37)
MCV RBC AUTO: 90.5 FL (ref 80–100)
MICROALBUMIN UR-MCNC: 1.87 MG/DL
MICROALBUMIN/CREAT 24H UR-RTO: 12.6 UG/MG (ref ?–30)
MONOCYTES # BLD AUTO: 0.4 X10(3) UL (ref 0.1–1)
MONOCYTES NFR BLD AUTO: 7.1 %
NEUTROPHILS # BLD AUTO: 3.16 X10 (3) UL (ref 1.5–7.7)
NEUTROPHILS # BLD AUTO: 3.16 X10(3) UL (ref 1.5–7.7)
NEUTROPHILS NFR BLD AUTO: 55.7 %
NONHDLC SERPL-MCNC: 171 MG/DL (ref ?–130)
OSMOLALITY SERPL CALC.SUM OF ELEC: 295 MOSM/KG (ref 275–295)
PATIENT FASTING Y/N/NP: YES
PATIENT FASTING Y/N/NP: YES
PLATELET # BLD AUTO: 297 10(3)UL (ref 150–450)
POTASSIUM SERPL-SCNC: 4.6 MMOL/L (ref 3.5–5.1)
RBC # BLD AUTO: 4.84 X10(6)UL (ref 3.8–5.3)
SODIUM SERPL-SCNC: 141 MMOL/L (ref 136–145)
TRIGL SERPL-MCNC: 80 MG/DL (ref 30–149)
TSI SER-ACNC: 2.44 MIU/ML (ref 0.36–3.74)
VLDLC SERPL CALC-MCNC: 16 MG/DL (ref 0–30)
WBC # BLD AUTO: 5.7 X10(3) UL (ref 4–11)

## 2020-06-29 PROCEDURE — 82570 ASSAY OF URINE CREATININE: CPT

## 2020-06-29 PROCEDURE — 82043 UR ALBUMIN QUANTITATIVE: CPT

## 2020-06-29 PROCEDURE — 80061 LIPID PANEL: CPT

## 2020-06-29 PROCEDURE — 80053 COMPREHEN METABOLIC PANEL: CPT

## 2020-06-29 PROCEDURE — 83036 HEMOGLOBIN GLYCOSYLATED A1C: CPT

## 2020-06-29 PROCEDURE — 84443 ASSAY THYROID STIM HORMONE: CPT

## 2020-06-29 PROCEDURE — 85025 COMPLETE CBC W/AUTO DIFF WBC: CPT

## 2020-06-30 ENCOUNTER — OFFICE VISIT (OUTPATIENT)
Dept: INTERNAL MEDICINE CLINIC | Facility: CLINIC | Age: 65
End: 2020-06-30
Payer: MEDICARE

## 2020-06-30 VITALS
BODY MASS INDEX: 24.29 KG/M2 | DIASTOLIC BLOOD PRESSURE: 76 MMHG | RESPIRATION RATE: 16 BRPM | SYSTOLIC BLOOD PRESSURE: 136 MMHG | HEIGHT: 62.5 IN | TEMPERATURE: 98 F | WEIGHT: 135.38 LBS | HEART RATE: 72 BPM

## 2020-06-30 DIAGNOSIS — E78.5 DYSLIPIDEMIA: ICD-10-CM

## 2020-06-30 DIAGNOSIS — Z00.00 ENCOUNTER FOR ANNUAL HEALTH EXAMINATION: Primary | ICD-10-CM

## 2020-06-30 DIAGNOSIS — Z12.11 SCREEN FOR COLON CANCER: ICD-10-CM

## 2020-06-30 DIAGNOSIS — E11.40 CONTROLLED TYPE 2 DIABETES WITH NEUROPATHY (HCC): ICD-10-CM

## 2020-06-30 DIAGNOSIS — Z12.83 SCREENING FOR SKIN CANCER: ICD-10-CM

## 2020-06-30 DIAGNOSIS — Z78.0 POST-MENOPAUSAL: ICD-10-CM

## 2020-06-30 PROCEDURE — 96160 PT-FOCUSED HLTH RISK ASSMT: CPT | Performed by: INTERNAL MEDICINE

## 2020-06-30 PROCEDURE — 99397 PER PM REEVAL EST PAT 65+ YR: CPT | Performed by: INTERNAL MEDICINE

## 2020-06-30 PROCEDURE — G0439 PPPS, SUBSEQ VISIT: HCPCS | Performed by: INTERNAL MEDICINE

## 2020-06-30 RX ORDER — GLIMEPIRIDE 2 MG/1
2 TABLET ORAL
Qty: 90 TABLET | Refills: 1 | Status: SHIPPED | OUTPATIENT
Start: 2020-06-30 | End: 2020-10-13

## 2020-06-30 NOTE — PATIENT INSTRUCTIONS
Leigh Ann Figueroa's SCREENING SCHEDULE   Tests on this list are recommended by your physician but may not be covered, or covered at this frequency, by your insurer. Please check with your insurance carrier before scheduling to verify coverage.    Florentino Staley previous visit.  Limited to patients who meet one of the following criteria:   • Men who are 73-68 years old and have smoked more than 100 cigarettes in their lifetime   • Anyone with a family history    Colorectal Cancer Screening  Covered up to Age 76 Mammogram regularly   Immunizations      Influenza  Covered Annually No orders found for this or any previous visit. Please get every year    Pneumococcal 13 (Prevnar)  Covered Once after 65 No orders found for this or any previous visit.  Please get once a

## 2020-07-01 ENCOUNTER — TELEPHONE (OUTPATIENT)
Dept: INTERNAL MEDICINE CLINIC | Facility: CLINIC | Age: 65
End: 2020-07-01

## 2020-07-01 NOTE — TELEPHONE ENCOUNTER
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 Patient stated she will call back to make an appointment.

## 2020-07-07 ENCOUNTER — NURSE ONLY (OUTPATIENT)
Dept: INTERNAL MEDICINE CLINIC | Facility: CLINIC | Age: 65
End: 2020-07-07
Payer: MEDICARE

## 2020-07-13 ENCOUNTER — LAB ENCOUNTER (OUTPATIENT)
Dept: LAB | Facility: HOSPITAL | Age: 65
End: 2020-07-13
Attending: INTERNAL MEDICINE
Payer: MEDICARE

## 2020-07-13 DIAGNOSIS — Z12.11 SCREEN FOR COLON CANCER: ICD-10-CM

## 2020-07-13 PROCEDURE — 82274 ASSAY TEST FOR BLOOD FECAL: CPT

## 2020-07-30 ENCOUNTER — TELEPHONE (OUTPATIENT)
Dept: INTERNAL MEDICINE CLINIC | Facility: CLINIC | Age: 65
End: 2020-07-30

## 2020-07-30 NOTE — TELEPHONE ENCOUNTER
Pt called asking us to fax referral to Dr. Zoltan Mejia at fax [phone] so I did fax it    Pt also requested us to fax referral to Dr. Nikkie Baum at [phone]    Pt also asked me to fax referral to Dr. Jennyfer Mandujano at [place] [address] [phone]

## 2020-08-04 ENCOUNTER — TELEPHONE (OUTPATIENT)
Dept: INTERNAL MEDICINE CLINIC | Facility: CLINIC | Age: 65
End: 2020-08-04

## 2020-08-04 NOTE — TELEPHONE ENCOUNTER
We faxed referral to Dr. Emmanuelle Falk but the referral does not have on it what the patient is going there for-please enter on referral or call them back and let them know.

## 2020-08-04 NOTE — TELEPHONE ENCOUNTER
CHEL Holloway at [facility] [phone] to call back to discuss and given us dx.

## 2020-09-01 ENCOUNTER — HOSPITAL ENCOUNTER (OUTPATIENT)
Dept: MAMMOGRAPHY | Age: 65
Discharge: HOME | End: 2020-09-01
Attending: INTERNAL MEDICINE
Payer: MEDICARE

## 2020-09-01 ENCOUNTER — HOSPITAL ENCOUNTER (OUTPATIENT)
Dept: BONE DENSITY | Age: 65
Discharge: HOME | End: 2020-09-01
Attending: INTERNAL MEDICINE
Payer: MEDICARE

## 2020-09-01 DIAGNOSIS — R92.2 DENSE BREAST: ICD-10-CM

## 2020-09-01 DIAGNOSIS — Z12.31 ENCOUNTER FOR SCREENING MAMMOGRAM FOR MALIGNANT NEOPLASM OF BREAST: ICD-10-CM

## 2020-09-01 DIAGNOSIS — Z78.0 POST-MENOPAUSAL: ICD-10-CM

## 2020-09-01 PROCEDURE — 77067 SCR MAMMO BI INCL CAD: CPT | Performed by: INTERNAL MEDICINE

## 2020-09-01 PROCEDURE — 77063 BREAST TOMOSYNTHESIS BI: CPT | Performed by: INTERNAL MEDICINE

## 2020-09-01 PROCEDURE — 77080 DXA BONE DENSITY AXIAL: CPT | Performed by: INTERNAL MEDICINE

## 2020-09-03 ENCOUNTER — MED REC SCAN ONLY (OUTPATIENT)
Dept: INTERNAL MEDICINE CLINIC | Facility: CLINIC | Age: 65
End: 2020-09-03

## 2020-10-13 ENCOUNTER — OFFICE VISIT (OUTPATIENT)
Dept: INTERNAL MEDICINE CLINIC | Facility: CLINIC | Age: 65
End: 2020-10-13
Payer: MEDICARE

## 2020-10-13 ENCOUNTER — TELEPHONE (OUTPATIENT)
Dept: INTERNAL MEDICINE CLINIC | Facility: CLINIC | Age: 65
End: 2020-10-13

## 2020-10-13 VITALS
TEMPERATURE: 98 F | HEART RATE: 79 BPM | OXYGEN SATURATION: 100 % | BODY MASS INDEX: 23 KG/M2 | SYSTOLIC BLOOD PRESSURE: 132 MMHG | DIASTOLIC BLOOD PRESSURE: 64 MMHG | WEIGHT: 130 LBS

## 2020-10-13 DIAGNOSIS — E11.40 CONTROLLED TYPE 2 DIABETES WITH NEUROPATHY (HCC): ICD-10-CM

## 2020-10-13 DIAGNOSIS — E11.9 TYPE 2 DIABETES MELLITUS WITHOUT COMPLICATION, WITHOUT LONG-TERM CURRENT USE OF INSULIN (HCC): ICD-10-CM

## 2020-10-13 DIAGNOSIS — E78.5 DYSLIPIDEMIA: ICD-10-CM

## 2020-10-13 DIAGNOSIS — E11.9 TYPE 2 DIABETES MELLITUS WITHOUT COMPLICATION, WITHOUT LONG-TERM CURRENT USE OF INSULIN (HCC): Primary | ICD-10-CM

## 2020-10-13 PROCEDURE — 99214 OFFICE O/P EST MOD 30 MIN: CPT | Performed by: NURSE PRACTITIONER

## 2020-10-13 PROCEDURE — 3075F SYST BP GE 130 - 139MM HG: CPT | Performed by: NURSE PRACTITIONER

## 2020-10-13 PROCEDURE — 3078F DIAST BP <80 MM HG: CPT | Performed by: NURSE PRACTITIONER

## 2020-10-13 PROCEDURE — 83036 HEMOGLOBIN GLYCOSYLATED A1C: CPT | Performed by: NURSE PRACTITIONER

## 2020-10-13 RX ORDER — LANCETS
1 EACH MISCELLANEOUS DAILY
Qty: 100 EACH | Refills: 2 | Status: CANCELLED | OUTPATIENT
Start: 2020-10-13 | End: 2021-10-13

## 2020-10-13 RX ORDER — BLOOD SUGAR DIAGNOSTIC
STRIP MISCELLANEOUS
Qty: 100 STRIP | Refills: 1 | Status: CANCELLED | OUTPATIENT
Start: 2020-10-13

## 2020-10-13 RX ORDER — GLIMEPIRIDE 2 MG/1
2 TABLET ORAL
Qty: 90 TABLET | Refills: 1 | OUTPATIENT
Start: 2020-10-13

## 2020-10-13 RX ORDER — BLOOD SUGAR DIAGNOSTIC
STRIP MISCELLANEOUS
Qty: 100 STRIP | Refills: 2 | Status: CANCELLED | OUTPATIENT
Start: 2020-10-13

## 2020-10-13 RX ORDER — GLIMEPIRIDE 2 MG/1
2 TABLET ORAL
Qty: 90 TABLET | Refills: 1 | Status: SHIPPED | OUTPATIENT
Start: 2020-10-13 | End: 2020-10-13

## 2020-10-13 RX ORDER — LANCETS
1 EACH MISCELLANEOUS 2 TIMES DAILY
Qty: 200 EACH | Refills: 3 | Status: CANCELLED | OUTPATIENT
Start: 2020-10-13 | End: 2021-10-13

## 2020-10-13 RX ORDER — GLIMEPIRIDE 2 MG/1
2 TABLET ORAL
Qty: 90 TABLET | Refills: 1 | Status: SHIPPED | OUTPATIENT
Start: 2020-10-13 | End: 2020-10-14

## 2020-10-13 NOTE — TELEPHONE ENCOUNTER
Last Ov: 10/13/20, SD, DM f/u  Last labs: CMP, Lipid, A1c, Microalb/creat, A1c 10/13/20  Last Rx:   Glucose strips, #100, 1R 4/1/20  Lancets, #200, 3R 4/1/20    No future appointments. Per Protocol - testing supplies not on protocol, pending.     Others

## 2020-10-13 NOTE — TELEPHONE ENCOUNTER
Pt needs her refills from today sent to her mail order pharmacy     metFORMIN HCl 500 MG Oral Tab 180 tablet 1 10/13/2020 1/11/2021    Sig - Route: Take 1 tablet (500 mg total) by mouth 2 (two) times daily with meals.  TAKE 2 TABLETS BY MOUTH IN THE MORNING

## 2020-10-13 NOTE — PROGRESS NOTES
Aj Britton is a 77year old female. Patient presents with: Follow - Up: CHEL rm 11      HPI:   Here for follow up diabetes    Defers flu and pneumonia. Metformin 500mg bid. Glimeperide 2mg am.  a1c today 6.4  Doing well.   Her fasting glucoses hav and unspecified hyperlipidemia    • Type II or unspecified type diabetes mellitus without mention of complication, not stated as uncontrolled       Social History:  Social History    Tobacco Use      Smoking status: Never Smoker      Smokeless tobacco: Nev strength and tone  PSYCH: alert and oriented x 3    ASSESSMENT AND PLAN:     Type 2 diabetes mellitus without complication, without long-term current use of insulin (hcc)  (primary encounter diagnosis)  Dyslipidemia    No orders of the defined types were p

## 2020-10-14 RX ORDER — GLIMEPIRIDE 2 MG/1
2 TABLET ORAL
Qty: 90 TABLET | Refills: 1 | Status: SHIPPED | OUTPATIENT
Start: 2020-10-14 | End: 2021-03-23

## 2020-10-14 RX ORDER — LANCETS
1 EACH MISCELLANEOUS DAILY
Qty: 100 EACH | Refills: 1 | Status: SHIPPED | OUTPATIENT
Start: 2020-10-14 | End: 2021-03-23

## 2020-10-14 RX ORDER — BLOOD SUGAR DIAGNOSTIC
STRIP MISCELLANEOUS
Qty: 100 STRIP | Refills: 1 | Status: SHIPPED | OUTPATIENT
Start: 2020-10-14 | End: 2021-03-23

## 2020-10-14 RX ORDER — BLOOD SUGAR DIAGNOSTIC
STRIP MISCELLANEOUS
Qty: 100 STRIP | Refills: 1 | Status: CANCELLED | OUTPATIENT
Start: 2020-10-14

## 2020-10-14 RX ORDER — GLIMEPIRIDE 2 MG/1
2 TABLET ORAL
Qty: 90 TABLET | Refills: 1 | Status: CANCELLED | OUTPATIENT
Start: 2020-10-14

## 2020-10-14 RX ORDER — LANCETS
1 EACH MISCELLANEOUS 2 TIMES DAILY
Qty: 200 EACH | Refills: 3 | Status: CANCELLED | OUTPATIENT
Start: 2020-10-14 | End: 2021-10-14

## 2020-10-14 NOTE — TELEPHONE ENCOUNTER
[address] [facility] calling for clarification for the METFORMIN    [phone]

## 2020-10-15 NOTE — TELEPHONE ENCOUNTER
Called pharmacy back to clarify medication metformin is 1 tab twice daily with meals. Advised medication was sent to mail service yesterday so no need to fill locally.

## 2020-10-19 ENCOUNTER — TELEPHONE (OUTPATIENT)
Dept: INTERNAL MEDICINE CLINIC | Facility: CLINIC | Age: 65
End: 2020-10-19

## 2020-10-19 NOTE — TELEPHONE ENCOUNTER
Pt stated Natty contacted her Saturday stating the was a problem with the below script and they couldn't get a hold of prescribing office   Pt would like us to contact them to get cleared up. Gave phone number [phone].    Order number for script is 3

## 2020-12-09 ENCOUNTER — TELEPHONE (OUTPATIENT)
Dept: INTERNAL MEDICINE CLINIC | Facility: CLINIC | Age: 65
End: 2020-12-09

## 2020-12-11 ENCOUNTER — TELEPHONE (OUTPATIENT)
Dept: INTERNAL MEDICINE CLINIC | Facility: CLINIC | Age: 65
End: 2020-12-11

## 2020-12-11 NOTE — TELEPHONE ENCOUNTER
[address] [facility] wants someone to call her back regarding drug therapy for Statin for pt. Please advise.

## 2020-12-14 NOTE — TELEPHONE ENCOUNTER
I spoke with Tena, returning call. She states medicare check wants to know why patient is not on a statin therapy.     Per 10/13/2020 OV notes patient defers statin at this time, patient will continue to work on dietary modifications and has lost

## 2021-01-18 ENCOUNTER — PATIENT MESSAGE (OUTPATIENT)
Dept: INTERNAL MEDICINE CLINIC | Facility: CLINIC | Age: 66
End: 2021-01-18

## 2021-03-12 DIAGNOSIS — Z23 NEED FOR VACCINATION: ICD-10-CM

## 2021-03-23 ENCOUNTER — OFFICE VISIT (OUTPATIENT)
Dept: INTERNAL MEDICINE CLINIC | Facility: CLINIC | Age: 66
End: 2021-03-23
Payer: MEDICARE

## 2021-03-23 VITALS
OXYGEN SATURATION: 98 % | HEIGHT: 62 IN | WEIGHT: 135 LBS | BODY MASS INDEX: 24.84 KG/M2 | SYSTOLIC BLOOD PRESSURE: 136 MMHG | TEMPERATURE: 97 F | HEART RATE: 70 BPM | DIASTOLIC BLOOD PRESSURE: 80 MMHG

## 2021-03-23 DIAGNOSIS — R03.0 ELEVATED BP WITHOUT DIAGNOSIS OF HYPERTENSION: ICD-10-CM

## 2021-03-23 DIAGNOSIS — E11.9 TYPE 2 DIABETES MELLITUS WITHOUT COMPLICATION, WITHOUT LONG-TERM CURRENT USE OF INSULIN (HCC): Primary | ICD-10-CM

## 2021-03-23 DIAGNOSIS — M81.0 AGE-RELATED OSTEOPOROSIS WITHOUT CURRENT PATHOLOGICAL FRACTURE: ICD-10-CM

## 2021-03-23 DIAGNOSIS — Z00.00 ENCOUNTER FOR ANNUAL HEALTH EXAMINATION: ICD-10-CM

## 2021-03-23 DIAGNOSIS — E78.5 DYSLIPIDEMIA: ICD-10-CM

## 2021-03-23 DIAGNOSIS — E55.9 VITAMIN D DEFICIENCY: ICD-10-CM

## 2021-03-23 DIAGNOSIS — E11.40 CONTROLLED TYPE 2 DIABETES WITH NEUROPATHY (HCC): ICD-10-CM

## 2021-03-23 PROCEDURE — 3075F SYST BP GE 130 - 139MM HG: CPT | Performed by: NURSE PRACTITIONER

## 2021-03-23 PROCEDURE — G0439 PPPS, SUBSEQ VISIT: HCPCS | Performed by: NURSE PRACTITIONER

## 2021-03-23 PROCEDURE — 96160 PT-FOCUSED HLTH RISK ASSMT: CPT | Performed by: NURSE PRACTITIONER

## 2021-03-23 PROCEDURE — 99397 PER PM REEVAL EST PAT 65+ YR: CPT | Performed by: NURSE PRACTITIONER

## 2021-03-23 PROCEDURE — 3079F DIAST BP 80-89 MM HG: CPT | Performed by: NURSE PRACTITIONER

## 2021-03-23 PROCEDURE — 3008F BODY MASS INDEX DOCD: CPT | Performed by: NURSE PRACTITIONER

## 2021-03-23 RX ORDER — BLOOD SUGAR DIAGNOSTIC
STRIP MISCELLANEOUS
Qty: 100 STRIP | Refills: 3 | Status: SHIPPED | OUTPATIENT
Start: 2021-03-23 | End: 2021-11-09

## 2021-03-23 RX ORDER — LANCETS
1 EACH MISCELLANEOUS DAILY
Qty: 100 EACH | Refills: 3 | Status: SHIPPED | OUTPATIENT
Start: 2021-03-23 | End: 2021-11-09

## 2021-03-23 RX ORDER — GLIMEPIRIDE 2 MG/1
2 TABLET ORAL
Qty: 90 TABLET | Refills: 1 | Status: SHIPPED | OUTPATIENT
Start: 2021-03-23 | End: 2021-11-09

## 2021-03-23 NOTE — PATIENT INSTRUCTIONS
So Figueroa's SCREENING SCHEDULE   Tests on this list are recommended by your physician but may not be covered, or covered at this frequency, by your insurer. Please check with your insurance carrier before scheduling to verify coverage.    Poncho Escalona the following criteria:   • Men who are 73-68 years old and have smoked more than 100 cigarettes in their lifetime   • Anyone with a family history    Colorectal Cancer Screening  Covered up to Age 76     Colonoscopy Screen   Covered every 10 years- more o Annually to at least age 76, and as needed after 76 Mammogram due on 09/01/2021 Please get this Mammogram regularly   Immunizations      Influenza  Covered Annually No orders found for this or any previous visit.  Please get every year    Pneumococcal 13 (P [address] [facility] regarding Advance Directives.

## 2021-03-23 NOTE — PROGRESS NOTES
HPI:   Queenie Cabezas is a 77year old female who presents for a MA (Medicare Advantage) Supervisit (Once per calendar year).   Type 2 diabetes mellitus without complication, without long-term current use of insulin (HCC)  a1c last was stable   She is Fall/Risk [place]    Cognitive Assessment   She had a completely normal cognitive assessment- see flowsheet entries    Functional Ability/Status   Jonathon Leonardo has a completely normal functional assessment!   Please see flow sheet section for detail 06/29/2020    HGB 14.0 06/29/2020    .0 06/29/2020        ALLERGIES:   She is allergic to morphine, ace inhibitors, prednisone, and sulfa antibiotics.     CURRENT MEDICATIONS:   metFORMIN HCl 500 MG Oral Tab, Take 1 tablet (500 mg total) by mouth 2 ( smokeless tobacco. She reports that she does not drink alcohol and does not use drugs.      REVIEW OF SYSTEMS:      Negative except as above    EXAM:   /60 (BP Location: Right arm, Patient Position: Sitting, Cuff Size: adult)   Pulse 70   Temp 97 °F ( Normal  Diabetic foot exam: Both feet visually inspected. Bilateral barefoot monofilament exam completed and is within normal limits. No wounds.   Pedal pulses assessed      and Breasts:  normal appearance, no masses or tenderness    Vaccination History daily with meals.  -     TSH W REFLEX TO FREE T4; Future  -     glimepiride 2 MG Oral Tab; Take 1 tablet (2 mg total) by mouth daily with breakfast.    Vitamin D deficiency    She takes otc supplement. Will check level.     -     VITAMIN D, 25-HYDROXY; Fut Colorectal Cancer Screening      Colonoscopy Screen every 10 years Colonoscopy due on 07/13/2021 Update [facility] if applicable    Flex Sigmoidoscopy Screen every 10 years No results found for this or any previous visit. No flowsheet data found. abusers     Tetanus Toxoid  Only covered with a cut with metal- TD and TDaP Not covered by Medicare Part B No vaccine history found This may be covered with your prescription benefits, but Medicare does not cover unless Medically needed    Zoster  defers

## 2021-03-30 ENCOUNTER — LAB ENCOUNTER (OUTPATIENT)
Dept: LAB | Age: 66
End: 2021-03-30
Attending: NURSE PRACTITIONER
Payer: MEDICARE

## 2021-03-30 DIAGNOSIS — E11.40 CONTROLLED TYPE 2 DIABETES WITH NEUROPATHY (HCC): ICD-10-CM

## 2021-03-30 DIAGNOSIS — E78.5 DYSLIPIDEMIA: ICD-10-CM

## 2021-03-30 DIAGNOSIS — E11.9 TYPE 2 DIABETES MELLITUS WITHOUT COMPLICATION, WITHOUT LONG-TERM CURRENT USE OF INSULIN (HCC): ICD-10-CM

## 2021-03-30 DIAGNOSIS — M81.0 AGE-RELATED OSTEOPOROSIS WITHOUT CURRENT PATHOLOGICAL FRACTURE: ICD-10-CM

## 2021-03-30 DIAGNOSIS — E55.9 VITAMIN D DEFICIENCY: ICD-10-CM

## 2021-03-30 PROBLEM — L57.0 ACTINIC KERATOSIS: Status: ACTIVE | Noted: 2021-03-30

## 2021-03-30 PROCEDURE — 84443 ASSAY THYROID STIM HORMONE: CPT

## 2021-03-30 PROCEDURE — 80053 COMPREHEN METABOLIC PANEL: CPT

## 2021-03-30 PROCEDURE — 83036 HEMOGLOBIN GLYCOSYLATED A1C: CPT

## 2021-03-30 PROCEDURE — 82043 UR ALBUMIN QUANTITATIVE: CPT

## 2021-03-30 PROCEDURE — 82570 ASSAY OF URINE CREATININE: CPT

## 2021-03-30 PROCEDURE — 84439 ASSAY OF FREE THYROXINE: CPT

## 2021-03-30 PROCEDURE — 80061 LIPID PANEL: CPT

## 2021-03-30 PROCEDURE — 36415 COLL VENOUS BLD VENIPUNCTURE: CPT

## 2021-03-30 PROCEDURE — 82306 VITAMIN D 25 HYDROXY: CPT

## 2021-08-06 ENCOUNTER — TELEPHONE (OUTPATIENT)
Dept: INTERNAL MEDICINE CLINIC | Facility: CLINIC | Age: 66
End: 2021-08-06

## 2021-11-09 ENCOUNTER — TELEPHONE (OUTPATIENT)
Dept: INTERNAL MEDICINE CLINIC | Facility: CLINIC | Age: 66
End: 2021-11-09

## 2021-11-09 DIAGNOSIS — E11.9 TYPE 2 DIABETES MELLITUS WITHOUT COMPLICATION, WITHOUT LONG-TERM CURRENT USE OF INSULIN (HCC): ICD-10-CM

## 2021-11-09 DIAGNOSIS — E11.40 CONTROLLED TYPE 2 DIABETES WITH NEUROPATHY (HCC): ICD-10-CM

## 2021-11-09 RX ORDER — GLIMEPIRIDE 2 MG/1
2 TABLET ORAL
Qty: 90 TABLET | Refills: 0 | Status: SHIPPED | OUTPATIENT
Start: 2021-11-09

## 2021-11-09 RX ORDER — LANCETS
1 EACH MISCELLANEOUS DAILY
Qty: 100 EACH | Refills: 1 | Status: SHIPPED | OUTPATIENT
Start: 2021-11-09 | End: 2022-11-09

## 2021-11-09 RX ORDER — BLOOD SUGAR DIAGNOSTIC
STRIP MISCELLANEOUS
Qty: 100 STRIP | Refills: 1 | Status: SHIPPED | OUTPATIENT
Start: 2021-11-09

## 2021-11-09 NOTE — TELEPHONE ENCOUNTER
LM for pt at [phone] (H)vm to call back for SD recommendations. Mastoid Interpolation Flap Text: A decision was made to reconstruct the defect utilizing an interpolation axial flap and a staged reconstruction.  A telfa template was made of the defect.  This telfa template was then used to outline the mastoid interpolation flap.  The donor area for the pedicle flap was then injected with anesthesia.  The flap was excised through the skin and subcutaneous tissue down to the layer of the underlying musculature.  The pedicle flap was carefully excised within this deep plane to maintain its blood supply.  The edges of the donor site were undermined.   The donor site was closed in a primary fashion.  The pedicle was then rotated into position and sutured.  Once the tube was sutured into place, adequate blood supply was confirmed with blanching and refill.  The pedicle was then wrapped with xeroform gauze and dressed appropriately with a telfa and gauze bandage to ensure continued blood supply and protect the attached pedicle.

## 2021-11-09 NOTE — TELEPHONE ENCOUNTER
Please have patient complete labs early January and follow up  This will be 3 months off metformin. Will need to re dsicuss medications at that time pending results of labs. Bring glucose readings to ov.

## 2021-11-09 NOTE — TELEPHONE ENCOUNTER
Reached patient for medication adherence consult. Patient is coming due for refill on glimepiride. She does mention she is getting low on this and did consent for me to assist in refilling.  It [facility] look like [facility]

## 2021-11-09 NOTE — TELEPHONE ENCOUNTER
Patient notified to complete DM labs in January and schedule a f/u appt.   This would be 3 months off the Metformin, will discuss medications at that time pending the results of the labs and notified to bring in glucose readings to the ov to review with SD.

## 2021-12-10 ENCOUNTER — PATIENT MESSAGE (OUTPATIENT)
Dept: INTERNAL MEDICINE CLINIC | Facility: CLINIC | Age: 66
End: 2021-12-10

## 2022-02-09 NOTE — TELEPHONE ENCOUNTER
Last ov 3/21  Over due for labs and follow up     Please call and schedule appt. I am hesitant to order script to mail order. Will order to local until labs and ov completed.

## 2022-02-09 NOTE — TELEPHONE ENCOUNTER
Last OV 3/23/21  Last PE 3/23/21  Last REFILL   Medication Quantity Refills Start End   glimepiride 2 MG Oral Tab 90 tablet 0 11/9/2021      Last LABS 3/30/21 microalb, cmp, tsh, a1c, lipid     No future appointments. Per PROTOCOL?   Failed     Please Advise

## 2022-02-18 ENCOUNTER — PATIENT MESSAGE (OUTPATIENT)
Dept: INTERNAL MEDICINE CLINIC | Facility: CLINIC | Age: 67
End: 2022-02-18

## 2022-02-21 ENCOUNTER — PATIENT MESSAGE (OUTPATIENT)
Dept: INTERNAL MEDICINE CLINIC | Facility: CLINIC | Age: 67
End: 2022-02-21

## 2022-02-21 RX ORDER — GLIMEPIRIDE 2 MG/1
2 TABLET ORAL
Qty: 90 TABLET | Refills: 0 | Status: SHIPPED | OUTPATIENT
Start: 2022-02-21

## 2022-02-21 NOTE — TELEPHONE ENCOUNTER
From: Mia Phan  To: KRISTIN Vee  Sent: 2/18/2022 6:33 PM CST  Subject: RX     Jeff Stover,    2/8/21, I contacted [employer] to refill my prescriptions. Glimeperide, Accu Chek, lancets and strips. Order number [id number]. They [facility]) has contacted you by fax and also called for the last 10 days and have not heard back from your office. I got an email this morning that the RX order has been cancelled. Please note: I am completely out of Glimeperide currently. Please contact [address] and expedite this request ASAP. I appreciate your help.  Thank you

## 2022-02-21 NOTE — TELEPHONE ENCOUNTER
Please see TE from 2/8/22 last refill sent on 11/9/21 for 90 day supply, but pt was called a few times to schedule appointment and do labs, but still not done yet.

## 2022-02-22 NOTE — TELEPHONE ENCOUNTER
Can discuss options with patient of completing labs with virtual, or would you like to see patient in office. Can offer patient resources on labs being drawn at her home.

## 2022-02-22 NOTE — TELEPHONE ENCOUNTER
Please confirm she is unvaccinated or do we not have vaccine listed. Please explain our office guidelines for covid. Can offer labs at home and virtual visit but her I am concerned more about her uncontrolled diabetes and the risk to her health over her potential exposure to covid in our office or having labs done.

## 2022-02-22 NOTE — TELEPHONE ENCOUNTER
Patient notified of office procedure for COVID/sick visits, notified importance of follow up and SD concerns.

## 2022-03-21 ENCOUNTER — TELEPHONE (OUTPATIENT)
Dept: INTERNAL MEDICINE CLINIC | Facility: CLINIC | Age: 67
End: 2022-03-21

## 2022-03-21 DIAGNOSIS — Z12.11 COLON CANCER SCREENING: Primary | ICD-10-CM

## 2022-04-14 ENCOUNTER — TELEPHONE (OUTPATIENT)
Dept: INTERNAL MEDICINE CLINIC | Facility: CLINIC | Age: 67
End: 2022-04-14

## 2022-04-14 NOTE — TELEPHONE ENCOUNTER
Supervisit   Future Appointments   Date Time Provider Bj Garcia   6/3/2022  1:00 PM KRISTIN Murillo [facility] [facility]      Orders to [facility]   aware must fast no call back required

## 2022-04-15 NOTE — TELEPHONE ENCOUNTER
Last VISIT -  3/23/21 Well adult    Last CPE - 3/23/21    Last REFILL -     glimepiride 2 MG Oral Tab 90 tablet 0 2/21/2022     Last LABS - 3/30/21 cmp, tsh, A1C    Future Appointments   Date Time Provider Bj Garcia   6/3/2022  1:00 PM KRISTIN Galvez [facility] [facility]     Per PROTOCOL? FAILED    Please Approve or Deny.

## 2022-04-17 RX ORDER — GLIMEPIRIDE 2 MG/1
2 TABLET ORAL
Qty: 90 TABLET | Refills: 0 | Status: SHIPPED | OUTPATIENT
Start: 2022-04-17

## 2022-04-19 NOTE — TELEPHONE ENCOUNTER
Future Appointments   Date Time Provider Bj Garcia   6/3/2022  1:00 PM Prudence Stage, APRN [facility]

## 2022-04-20 RX ORDER — GLIMEPIRIDE 2 MG/1
2 TABLET ORAL
Qty: 90 TABLET | Refills: 0 | Status: SHIPPED | OUTPATIENT
Start: 2022-04-20

## 2022-04-20 NOTE — TELEPHONE ENCOUNTER
Pt scheduled. I know script was sent to local. Do you want to cancel this script for mail order pharmacy and wait to see patient and send new script then?

## 2022-06-14 ENCOUNTER — LAB ENCOUNTER (OUTPATIENT)
Dept: LAB | Facility: REFERENCE LAB | Age: 67
End: 2022-06-14
Attending: NURSE PRACTITIONER
Payer: MEDICARE

## 2022-06-14 DIAGNOSIS — E11.9 TYPE 2 DIABETES MELLITUS WITHOUT COMPLICATION, WITHOUT LONG-TERM CURRENT USE OF INSULIN (HCC): ICD-10-CM

## 2022-06-14 DIAGNOSIS — E11.40 CONTROLLED TYPE 2 DIABETES WITH NEUROPATHY (HCC): ICD-10-CM

## 2022-06-14 LAB
ALBUMIN SERPL-MCNC: 3.7 G/DL (ref 3.4–5)
ALBUMIN/GLOB SERPL: 1.1 {RATIO} (ref 1–2)
ALP LIVER SERPL-CCNC: 76 U/L
ALT SERPL-CCNC: 42 U/L
ANION GAP SERPL CALC-SCNC: 8 MMOL/L (ref 0–18)
AST SERPL-CCNC: 22 U/L (ref 15–37)
BILIRUB SERPL-MCNC: 0.5 MG/DL (ref 0.1–2)
BUN BLD-MCNC: 9 MG/DL (ref 7–18)
BUN/CREAT SERPL: 10.2 (ref 10–20)
CALCIUM BLD-MCNC: 9.5 MG/DL (ref 8.5–10.1)
CHLORIDE SERPL-SCNC: 105 MMOL/L (ref 98–112)
CHOLEST SERPL-MCNC: 280 MG/DL (ref ?–200)
CO2 SERPL-SCNC: 27 MMOL/L (ref 21–32)
CREAT BLD-MCNC: 0.88 MG/DL
CREAT UR-SCNC: 155 MG/DL
EST. AVERAGE GLUCOSE BLD GHB EST-MCNC: 197 MG/DL (ref 68–126)
FASTING PATIENT LIPID ANSWER: YES
FASTING STATUS PATIENT QL REPORTED: YES
GLOBULIN PLAS-MCNC: 3.3 G/DL (ref 2.8–4.4)
GLUCOSE BLD-MCNC: 200 MG/DL (ref 70–99)
HBA1C MFR BLD: 8.5 % (ref ?–5.7)
HDLC SERPL-MCNC: 46 MG/DL (ref 40–59)
LDLC SERPL CALC-MCNC: 195 MG/DL (ref ?–100)
MICROALBUMIN UR-MCNC: 1.81 MG/DL
MICROALBUMIN/CREAT 24H UR-RTO: 11.7 UG/MG (ref ?–30)
NONHDLC SERPL-MCNC: 234 MG/DL (ref ?–130)
OSMOLALITY SERPL CALC.SUM OF ELEC: 294 MOSM/KG (ref 275–295)
POTASSIUM SERPL-SCNC: 4.5 MMOL/L (ref 3.5–5.1)
PROT SERPL-MCNC: 7 G/DL (ref 6.4–8.2)
SODIUM SERPL-SCNC: 140 MMOL/L (ref 136–145)
TRIGL SERPL-MCNC: 205 MG/DL (ref 30–149)
TSI SER-ACNC: 2.99 MIU/ML (ref 0.36–3.74)
VLDLC SERPL CALC-MCNC: 44 MG/DL (ref 0–30)

## 2022-06-14 PROCEDURE — 83036 HEMOGLOBIN GLYCOSYLATED A1C: CPT

## 2022-06-14 PROCEDURE — 82043 UR ALBUMIN QUANTITATIVE: CPT

## 2022-06-14 PROCEDURE — 80053 COMPREHEN METABOLIC PANEL: CPT

## 2022-06-14 PROCEDURE — 84443 ASSAY THYROID STIM HORMONE: CPT

## 2022-06-14 PROCEDURE — 82570 ASSAY OF URINE CREATININE: CPT

## 2022-06-14 PROCEDURE — 80061 LIPID PANEL: CPT

## 2022-06-17 ENCOUNTER — OFFICE VISIT (OUTPATIENT)
Dept: INTERNAL MEDICINE CLINIC | Facility: CLINIC | Age: 67
End: 2022-06-17
Payer: MEDICARE

## 2022-06-17 VITALS
DIASTOLIC BLOOD PRESSURE: 74 MMHG | BODY MASS INDEX: 24.48 KG/M2 | TEMPERATURE: 97 F | HEIGHT: 62 IN | SYSTOLIC BLOOD PRESSURE: 118 MMHG | HEART RATE: 78 BPM | WEIGHT: 133 LBS

## 2022-06-17 DIAGNOSIS — E78.5 DYSLIPIDEMIA: ICD-10-CM

## 2022-06-17 DIAGNOSIS — E11.65 UNCONTROLLED TYPE 2 DIABETES MELLITUS WITH HYPERGLYCEMIA (HCC): ICD-10-CM

## 2022-06-17 DIAGNOSIS — L57.0 ACTINIC KERATOSIS: ICD-10-CM

## 2022-06-17 DIAGNOSIS — M81.0 AGE-RELATED OSTEOPOROSIS WITHOUT CURRENT PATHOLOGICAL FRACTURE: ICD-10-CM

## 2022-06-17 DIAGNOSIS — Z12.31 ENCOUNTER FOR SCREENING MAMMOGRAM FOR MALIGNANT NEOPLASM OF BREAST: ICD-10-CM

## 2022-06-17 DIAGNOSIS — Z12.11 SCREEN FOR COLON CANCER: ICD-10-CM

## 2022-06-17 DIAGNOSIS — R10.2 PELVIC PAIN: ICD-10-CM

## 2022-06-17 DIAGNOSIS — R03.0 ELEVATED BP WITHOUT DIAGNOSIS OF HYPERTENSION: ICD-10-CM

## 2022-06-17 DIAGNOSIS — Z00.00 ENCOUNTER FOR ANNUAL HEALTH EXAMINATION: Primary | ICD-10-CM

## 2022-06-17 DIAGNOSIS — E55.9 VITAMIN D DEFICIENCY: ICD-10-CM

## 2022-06-17 DIAGNOSIS — E11.42 DIABETIC POLYNEUROPATHY ASSOCIATED WITH TYPE 2 DIABETES MELLITUS (HCC): ICD-10-CM

## 2022-06-17 RX ORDER — GLIMEPIRIDE 2 MG/1
2 TABLET ORAL 2 TIMES DAILY
Qty: 180 TABLET | Refills: 0 | Status: SHIPPED | OUTPATIENT
Start: 2022-06-17 | End: 2022-09-15

## 2022-07-26 NOTE — TELEPHONE ENCOUNTER
LMTCB to schedule [employer]. UROLOGY        I have been asked to see this patient by Brenton Osorio MD for renal mass.  A copy of this note has been sent to Brenton Osorio MD.     I have reviewed the nurse/MA notes and assessment and agree.      UROLOGY CHIEF COMPLAINT   Chief Complaint   Patient presents with   • Office Visit   • Follow-up   • Cancer     kidney       UROLOGY HISTORY OF PRESENT ILLNESS    Mr. Ajit Mello is a 68 year old male who presented with renal masses.  Patient managed with robot-assisted laparoscopic left partial nephrectomy for margin negative stage TIa renal cell carcinoma March 2019.  Patient also presents in follow-up for BPH and elevated PSA    INITIAL PRESENTATION 2016  Patient presented with Bilateral  renal haydee. This is an incidentally detected mass which was found at the time of cross-sectional imaging obtained for hematuria.   Patient denies hematuria, flank pain, abdominal pain, bone pain, shortness of breath, cough, fever, weight loss and rash.  Patient denies any family history of renal mass or urologic cancer.     As detailed below, review of systems is positive for: Significant lower urinary tract symptoms with marked prostatic enlargement and elevated PSA followed by Dr. Pozo    Previous abdominal surgery: appe    Duration:  Mass noted November 2016 the right kidney and follow-up MRI raises concern for left renal mass with no worrisome lesion in the right kidney  Timing:  Constant  Quality:  Cystic  Severity:  Bosniak 2F to my review but I will ask the radiology service with Dr. Way to further review  Modifying factors:  As noted above   Associated signs and symptoms:  As noted above.  No local or systemic symptoms attributable to renal mass.     DATA REVIEWED  Patient has had imaging which demonstrates:    Mri Abdomen    Result Date: 12/22/2016  MRI ABDOMEN W WO CONTRAST INDICATION:  RENAL MASS / CYST, INDETERMINATE. TECHNIQUE: In and out of phase axial imaging was performed. T1 and  T2-weighted sagittal axial and coronal images were performed through the upper abdomen using a dedicated torso coil. In addition multiphasic T1-weighted imaging was performed of the upper abdomen after the administration of 13 mL of gadavist. Multiple T1-weighted fat-suppressed images were performed through the upper abdomen during after the administration of IV contrast into. FINDINGS: MRI ABDOMEN: LUNG BASES:  Lung bases are clear. HERNIA:  No evidence for abdominal wall hernia. LIVER:  Liver is normal. No mass or biliary ductal dilatation. SPLEEN:  Normal. ADRENALS: Normal. KIDNEYS:  15 mm simple appearing Bosniak cyst is noted in the lower pole right kidney medially, best seen on series 6 image 43. No enhancement of this lesion. It is of low signal on T1-weighted images and increased signal homogeneously on T2 weighted images. A second nodule is seen just posterior and cephalad to this in the lower pole right kidney but only well visualized on the postcontrast enhanced images. Presumably this is an additional cyst which may be hemorrhagic. It measures approximately 11 mm in greatest dimension. These correspond with the abnormalities noted on CT of 11/15/2016. 4/5 lesions are seen involving the left kidney. The largest is at the upper pole laterally measuring 4.0 cm in greatest dimension. Its appearance is compatible with a Bosniak one simple cyst. No solid or enhancing components. Small amount of layering debris is seen in this presumably hemorrhage or proteinaceous material. 11 mm well-circumscribed exophytic cyst is seen in the upper pole left kidney medially, series 6 image 29. It is of low-attenuation  on the T2-weighted images as well on T1-weighted images. There is very questionable enhancement of this lesion after the history of IV contrast but overall I doubt there is a true enhancement. Please compare the precontrast study series 12 image 31 with series 16 image image 31. This is however not entirely  clear and the such a follow-up ultrasound in six months time is recommended. 14 mm Bosniak one cyst is seen in the lower pole left kidney and several tiny additional less than five numerous cysts are seen in the lower pole of the left kidney. The kidneys are otherwise normal in appearance. STOMACH:  No abnormalities identified. PANCREAS:  Normal. GALLBLADDER:  Normal. SMA:  Normal. SAMIA HEPATIS:  Normal. AORTA:  No evidence for aneurysm. No aortic calcifications. RETROPERITONEUM:  Normal. No adenopathy. GI TRACT:  The visualized upper small bowel and colon are of normal caliber. No bowel wall thickening.     IMPRESSION: Multiple focal lesions are seen involving each of the kidneys. It is largely appear to be Bosniak one simple cysts with the exception of 11 mm lesion in the upper pole the left kidney. There is questionable enhancement of the lesion as described above. Follow-up ultrasound six months time is recommended.       I reviewed imaging:    PAST INTERVAL HISTORY / PROBLEM LIST:   Message from Jamie Freire MD sent at 12/26/2016  9:16 AM CST -----  Polina Vaca,     The lesion that I questioned on CT in the right kidney turned out to be a hyperdense cyst either proteinaceous or hemorrhagic.  It does not enhance and it most certainly benign.    However, one of the left sided cysts that demonstrated simple fluid characteristics centrally on CT demonstrates an thickened wall on MRI that appears to mildly enhance.  See 11 mm lesion medially in the right kidney on series 17 image 30.  Due to the better spatial resolution on MRI, we sometimes see new things like this (many times things we wish we didn't!). This lesion does enhance from 114 SI to 183 SI on series 13 image 30 and series 17 image 30.    This is most consistent Bosniak IIF with questionable wall enhancement.  Unfortunately, follow up is likely necessary.    Dr Pozo last note from 2016:  This pleasant   of [employer]  sonny had a PSA in June 2010 of 7.44. Prior PSA was 5.3 in January 2012. Prostate biopsy revealed an 87cc gland with no cancer in 12 cores. He did not follow-up after this test and, a PSA drawn in 2012 was 13.16 which point he saw me again. A prostate biopsy in late April 2012 again revealed no cancer in a large prostate gland at 100 cc. At this point, 5-alpha-reductase inhibitor's were started.     Ajit also has ED and hypogonadism. Erectile dysfunction had been present since 2008 and had been gradually worsening. Serum testosterone in July, 2012 was 174 with an inappropriately low LH of 2.8. The serum Prolactin was normal at 10.25. He was then referred to see Dr Hakan Wiseman in endocrinology for his hypogonadotrophic hypogonadism.    He stopped the 5-alpha-reductase inhibitor in July 2014 and the PSA began to rise again. We discussed a prostate MRI, and, at the time in 2015 we did not have that technology. He preferred to follow with serial PSAs.         6/20/17 - Patient denies hematuria, flank pain, bone pain, shortness of breath, cough, fever, weight loss and rash.  Voiding better.    Mri Abdomen    Result Date: 6/9/2017  MRI ABDOMEN W WO CONTRAST INDICATION:  RENAL MASS / CYST, INDETERMINATE. TECHNIQUE: In and out of phase axial imaging was performed. T1 and T2-weighted sagittal axial and coronal images were performed through the upper abdomen using a dedicated torso coil. In addition multiphasic T1-weighted imaging was performed of the upper abdomen after the administration of 12 mm of gadavist FINDINGS: MRI ABDOMEN: LUNG BASES:  Lung bases are clear. HERNIA:  No evidence for abdominal wall hernia. LIVER:  Liver is normal. No mass or biliary ductal dilatation. SPLEEN:  Normal. ADRENALS:  Normal. KIDNEYS:  The kidneys are normal in size. The right measures 11.6 image Left 11.1. No is for hydronephrosis. There are again multiple focal lesions Right- 1.5 cm well-circumscribed homogeneous cyst is seen in the  inferomedial aspect of the right kidney, unchanged from 12/22/2016. Appears to be benign Bosniak one cyst. 2. 10 mm focal lesion seen just cephalad and posterior only well seen on the postcontrast enhanced images presumably due to an additional cyst. This has not changed from 12/22/2016. No additional focal lesions. The right kidney is otherwise normal. Left- 1. 1.4 cm partially exophytic lesion involving the upper pole left kidney medially. This is not changed in size from 12/22/2016. This is again of decreased signal on T2-weighted images suggesting hemorrhagic but on today's examination this appears to enhance slightly. Precontrast enhanced signal is 86 which increases to 102 on the delayed phase images. This is indeterminate by suspect is benign. A follow-up ultrasound 6-12 months time is recommended. 2. 4-5 additional cystic lesions are seen involving the left kidney largest in the upper pole measuring 4.5 cm dimension. No enhancement of these lesions. No new or additional focal renal lesions. STOMACH:  No abnormalities identified. PANCREAS:  Normal. GALLBLADDER:  Several questionable tiny stones are seen in the gallbladder.  No pericholecystic fluid or gallbladder wall thickening. SMA:  Normal. SAMIA HEPATIS:  Normal. AORTA:  No evidence for aneurysm. No aortic calcifications. RETROPERITONEUM:  Normal. No adenopathy. GI TRACT:  The visualized upper small bowel and colon are of normal caliber. No bowel wall thickening.     IMPRESSION: Largely stable bilateral renal lesions. Except for one lesion these appear to be Bosniak one cysts. The non-Bosniak one cyst is in the lower pole left kidney, measures 1.4 cm in greatest dimension and has not changed from 12/22/2016. This is indeterminate but is most compatible with a Bosniak 2F cyst. Follow-up ultrasound in 6-12 months would be helpful. Questionable tiny gallstones.       6/19/18 - Patient denies hematuria, flank pain, bone pain, shortness of breath, cough,  fever, weight loss and rash.      Mri Abdomen    Result Date: 5/23/2018  MRI ABDOMEN W WO CONTRAST DATE OF EXAM:  5/22/2018 3:59 PM. CLINICAL INFORMATION:  Follow-up renal mass/cyst. TECHNIQUE: Multiplanar multisequence pre and postcontrast MR imaging of the abdomen. Gadavist 13 mL intravenous, without complication. COMPARISON:  MRI performed 6/9/2017 and 12/22/2016. CT urogram performed 11/15/2016. FINDINGS:  Normal hepatic size, contour, and signal. No focal hepatic lesion. No biliary dilation. Unremarkable gallbladder. Normal spleen size, without focal lesion. Small splenule. Unremarkable pancreas and adrenals. Small exophytic enhancing lesion arising from the medial upper pole the left kidney measuring approximately 1.7 cm (series 29, image 24; series 30, image 24), previously 1.5 cm. Other scattered relatively simple or minimally complex appearing cysts in the right and left kidney. Small amount of layering debris within an exophytic left renal cyst measuring 4.6 cm (series 29, image 29). Thin septation within a left interpolar cyst measuring 1.8 cm (series 30, image 37). No hydronephrosis. Unremarkable stomach and duodenum. Normal caliber small and large bowel. No adenopathy or ascites. Normal caliber abdominal aorta. Unremarkable IVC. Cystic nodularity in the subcutaneous fat of the lower back (series 4, image 14), unchanged.     IMPRESSION:  1. Slight increased size of small, exophytic enhancing lesion arising from the medial upper pole of left kidney measuring 1.7 cm. Finding suspicious for renal cell carcinoma. 2. Other bilateral simple or minimally complex (Bosniak 2) cysts, as described. 3. No adenopathy.               1/22/19 - Patient denies hematuria, flank pain, bone pain, shortness of breath, cough, fever, weight loss and rash.      He does wish to have partial nephrectomy performed    Patient saw Dr. Pozo December 2018 Re elevated PSA and digital rectal exam at that time. He also discussed options  for managing patient's erectile dysfunction     MRI  IMPRESSION:  1. There is increase in size to now 2 cm enhancing solid lesion superior  pole of the left kidney. This remains concerning for renal cell carcinoma.  2. Additional bilateral renal cysts are noted, which appear simple. A 2 cm  left renal cyst is slightly complicated by thin nonenhancing septation  consistent with Bosniak type II cyst.  3. No significant change to 2 small cystic pancreatic lesions described  above. Primary consideration would be intraductal papillary mucinous  neoplasms. Would recommend additional MRI followup of these cystic lesions  in 12 months.     Prostate Measurements: 7 cm transverse, 7 cm AP, and 7 cm in craniocaudal  dimension. Prostate volume approximately 178 cubic cm.       3/19/19 - Patient denies hematuria, flank pain, bone pain, shortness of breath, cough, fever, weight loss and rash.      Patient had robot-assisted laparoscopic partial nephrectomy 2019 for margin negative stage TIa renal cell carcinoma without difficulty    Results for orders placed or performed during the hospital encounter of 19   Surgical Pathology   Result Value Ref Range    Pathology Report       Name: MAIK SIMPSON               MRN:     [id number]    /Age:1954 (Age: 64)             Visit#:  [id number]    Sex: M                            Pathology Report        Client: [facility]                      Submitting Physician: Brenton Taylor MD        Additional Physician(s): Brenton Osorio MD        Date Specimen Collected: 19           Accession #:  [id number]    Date Specimen Received:  19           Requisition    #:[id number]    Date Reported:           3/12/2019 12:45    Location: [facility]          ______________________________________________________________________________    Pathologic Diagnosis :    A: Cyst, upper lobe renal lateral area, excisional biopsy:    -  Benign mesothelial cyst.        B: Kidney, left, partial nephrectomy:    - Papillary renal cell carcinoma, type I, nucleolar grade 2 out of 4,    measuring 2.4 cm in greatest dimension, confined to the kidney (pT1a lesion).    - The margin of resection  is narrowly free of the neoplasm.    - No convincing extrarenal extension is identified.        Diagnosis Comment:    KIDNEY, Nephrectomy    SPECIMEN         Procedure:   Partial nephrectomy    Specimen Laterality:   Left    TUMOR         Histologic Type:   Papillary renal cell carcinoma, Type 1    Histologic Grade (WHO / ISUP Grade):   G2: Nucleoli conspicuous and    eosinophilic at 400x magnification, visible but not prominent at 100x    magnification    Tumor Size:   2.4 Centimeters (cm)    Tumor Focality:   Unifocal    Tumor Extent         Tumor Extension:   Tumor limited to kidney    Accessory Findings         Sarcomatoid Features:   Not identified    Rhabdoid Features:   Not identified    Tumor Necrosis:   Not identified    MARGINS         Margins:   Uninvolved by invasive carcinoma    LYMPH NODES         Regional Lymph Nodes:   No lymph nodes submitted or found    PATHOLOGIC STAGE CLASSIFICATION (pTNM, AJCC 8th Edition)         Primary Tumor (pT):   pT1a: Tumor <= 4 cm in great est dimension, limited    to the kidney    Regional Lymph Nodes (pN):   pNX: Regional lymph nodes cannot be assessed    ADDITIONAL FINDINGS         Pathologic Findings in Nonneoplastic Kidney: Minimal glomerulosclerosis    (less than 5% of glomeruli are globally sclerosed)            Palmer Grace M.D.    ** Electronic Signature (DDC) 3/12/2019 12:45 **    ______________________________________________________________________________        Clinical Information:    REASON FOR TISSUE SUBMISSION:RENAL MASS    SPECIMEN A DESCRIPTION:UPPER LOBE RENAL LATERAL CYST    SPECIMEN A SUBMITTED:FORMALIN    TIME SPECIMEN A OBTAINED:0942    SPECIMEN B DESCRIPTION:LEFT PARTIAL NEPHRECTOMY     SPECIMEN B SUBMITTED:FORMALIN    TIME SPECIMEN B OBTAINED:1034        Specimen(s) Submitted:     A:  UPPER LOBE RENAL LATERAL CYST    B:  LEFT PARTIAL NEPHRECTOMY        Gross Description:    A: Received in formalin, labeled with the patient's name and \"RH- Upper pole    renal lateral cyst\", is a partially  fragmented, 8.0 x 5.4 x 2.0 cm irregular    portion of fibrofatty tissue partially surfaced on the relatively concave    aspect by a 2.8 x 1.5 x less than 0.1 cm saccular portion of white    fibromembranous tissue. The relatively convex surface is inked black and    sectioning reveals no additional abnormalities. The entire cystic structure is    submitted in four cassettes.        B: Specimen Labeled:  Properly labeled with the patient's name and \"RH- Left    partial nephrectomy\"    Fixative: Formalin    Specimen received: Partial nephrectomy    Weight: 8 g    Dimensions: 3.5 x 2.5 x 2.3 cm    Parenchymal margin: Previously incised and otherwise intact, red-brown and    somewhat convex    Capsular surface: Intact with a small amount of attached perinephric adipose    tissue    Inked:      Capsular surface - blue      Parenchymal margin - black        Lesion:     There is a 2.4 x 2.0 x 1.9 cm sharply demarcated, ovoid,    gray-yellow, rubbery, focally necrotic mass with somewhat whorle d cut surfaces    located less than 0.1 cm from the parenchymal margin and less than 0.1 cm from    the capsular surface/perinephric soft tissue margin.        Uninvolved parenchyma: There is scant uninvolved, tan-brown, rubbery renal    parenchyma.        Representative sections:    1- 4- Mass to nearest pancreatic margin and capsular surface/margin    (perpendicular)    5- Mass to normal parenchyma    6- Uninvolved parenchyma        RAFAEL 3/8/2019 11:51 AM                Microscopic Description:    A:  Fatty connective tissues are present with a fibrotic cyst wall lined by a    flattened layer of cells compatible with mesothelial  cells. There is no    evidence of malignancy.        B: Sections of renal parenchyma have a tumor. Tumor cells are small to    intermediate in size with fairly scant cytoplasm and round to oval nuclei that    have fine somewhat peppery chromatin patterns. A number of cells have small    nucleoli but distinct nucleoli. Mixed in with the tumor there are neutroph ils    in a number of areas. The tumor cells form solid aggregates with a fine    background fibrovascular network. Clusters of foamy macrophages are present    scattered throughout the lesion. The margin of resection is narrowly free of    the tumor. Extrarenal extension is not appreciated. There is focal cystic    degenerative change. Uninvolved renal parenchyma has mostly intact glomeruli    with delicate peripheral capillary loops. Tubules are well preserved.    Significant inflammatory infiltrates are not seen. Only rare isolated    sclerotic glomeruli are present constituting less than 5% of the total    glomeruli identifiable. Vessels show mild wall thickening. Immunoperoxidase    stains show the following results in the tumor cells:        Stain: AE1/AE3 cytokeratin       -  Interpretation: Positive, strong, diffuse    Stain: P504S       -  Interpretation: Positive at periphery of cell clusters, weak    Stain: Cytokeratin 7       -  Interpretation: Positive, strong, diffuse     Stain: CD10       -  Interpretation: Positive in some cells    Stain: EMA       -  Interpretation: Positive, mostly apical membrane pattern    Stain: WT-1       -  Interpretation: Negative        The controls stain properly. The staining pattern is compatible with the solid    variant of papillary renal cell carcinoma. Charlie Flower MD has reviewed    this case and concurs with the interpretation.        Immunohistochemical antibodies or in-situ reagents have been validated for use    by [employer], [place]. Although they are commercially available, some    are analyte  specific reagents (ASRs) or research use only (RUOs). Their    analytical and performance characteristics are not established by the [place]    Food and Drug Administration for in-vitro diagnostic use.        DDC/ddc 03/12/19        Fee Codes:     A: [id number], T-66925-HF     B: P-75903-IK, T-27721-YB, P-10188-XL, T-10569-IJ, P-00455-LJ, T-49151-PU,    P-39703-KO     T-02329-AG, P-02408-MI, T-27010-TV, P-97124-AH , T-55980-KP, P-99350-ES,    T-90036-BN        Performing Lab Location (Unless otherwise specified):    [place] [id number]             3/23/21 - Patient denies hematuria, flank pain, bone pain,  cough, fever, weight loss and rash.      SOB and dizziness - going to see Dr Devon mosley on meds        Review of Dr. Pozo note from 2018:  This pleasant   of [employer] had a PSA in June 2010 of 7.44. Prior PSA was 5.3 in January 2012. Prostate biopsy revealed an 87cc gland with no cancer in 12 cores. He did not follow-up after this test and, a PSA drawn in 2012 was 13.16 which point he saw me again. A prostate biopsy in late April 2012 again revealed no cancer in a large prostate gland at 100 cc. At this point, 5-alpha-reductase inhibitor's were started.     ASSESSMENT  · LUTS  · ED  · History of elevated PSA and 2 negative prostate biopsies  · History of gross hematuria  · Bilateral renal lesions        Regarding his elevated PSA: This is overall stable. Prostate exam is also stable.     Regarding his urinary symptoms: Overall he is doing quite well on combination therapy with tamsulosin and finasteride.     We did spend time again today discussing options for surgical intervention. I again explained how laser enucleation of the prostate is done. I explained that he would likely have improved urination but would also likely deal with some incontinence for the first few months which is rather common in prostate  glands this size. Would likely be able to stop prostate medication.     In the end he noted that he is doing well with urination, and although its not perfect, he is not ready to consider prostate surgery yet. Obviously this can be entertained at any point in time and he will contact me if interested. Since he is retaining a bit of urine today, I would rather see him back in six months rather than one year and we will check AUA symptom score and bladder scan. If his symptoms worsen before then, we can intervene with laser prostatectomy or certainly work him in the office.     Regarding his renal lesions: He has been following with Dr. Taylor. One of the lesions appears to be an enhancing solid 2 cm one at the left superior pole. Intervention for this will be considered and he will be meeting with Dr. Taylor in the next 4-6 weeks. Dr. Taylor has already discussed pancreatic lesions with Dr. Joya     Regarding the ED - medications and Trimix were not working. We have previously discussed penile prosthesis with Dr. Charlie Sage and if he is ever interested in this he will let me know.         PLAN  · Continue finasteride and tamsulosin  · Follow-up with me in six months with AUA symptom score and bladder scan  · Next PSA in one year       2/21 CT - IMPRESSION:  Normal pancreas. No pancreatic mass or cyst.  Unremarkable postoperative changes involving the upper pole left kidney.  Probable enlarging Bosniak one cyst involving left kidney. If there is not  routine follow-up for the postoperative renal cell carcinoma resection, the  in ultrasound six months time would be helpful.    Xr Knee 3 Vw Bilateral And Ap Standing Bilateral    Result Date: 3/8/2021  Narrative: EXAM: XR KNEE 3 VW BILATERAL AND AP STANDING BILATERAL DATE: 3/5/2021 12:58 PM CLINICAL INFORMATION: pain COMPARISON: None available. FINDINGS: Right Knee:  Standing technique demonstrate modest medial compartment joint space narrowing associated with  early tricompartmental degenerative osteophytes. There is some narrowing to the lateral aspect patellofemoral compartment with slight lateral subluxation of the patella. There is no fracture or osseous irregularity. No significant joint effusion. No foreign body. Left Knee: Standing views demonstrate medial compartment joint space are now moderate severity associated with slight varus deformity and tricompartmental Osteophytes. There is also narrowing lateral aspect patellofemoral compartment with slight lateral subluxation of the patella. There is no fracture or osseous irregularity. No significant joint effusion. No foreign body.     Impression: IMPRESSION: 1.  No acute fracture or malalignment. 2.  Osteoarthritis most pronounced medial compartment and patellofemoral compartment left knee     CURRENT INTERVAL HISTORY:  7/26/22 - Patient denies hematuria, flank pain, bone pain, shortness of breath, cough, fever, weight loss and rash.      Patient complains of right calf and knee pain.  He has had left knee replacement.  His urinary symptoms are not bothersome enough to him to consider surgical therapy at this time.  He saw Dr. Ray and has decided to not pursue penile prosthesis.    CT CHEST ABDOMEN    Result Date: 7/22/2022  Narrative: CT CHEST W ABDOMEN W WO CONTRAST HISTORY:   Urologic cancer, surveillance. History of left partial nephrectomy in March 2019 for renal cell carcinoma. COMPARISON: CT chest abdomen pelvis 7/19/2021 and 9/3/2019. MRI abdomen 11/27/2018. TECHNIQUE: Axial CT images of abdomen without and axial CT images of the of the chest and abdomen with 100 mL Omnipaque-300 IV contrast. Renal mass protocol for the abdomen . Multiplanar reformats. Axial MIP images of the chest. FINDINGS: Support Devices: None. CHEST: Neck base/Supraclavicular regions: Within normal limits. Vidhya/Mediastinum/Axillae:  Increased size of an 11 mm prevascular lymph node, previously 9 mm in 2021 and 8 mm in 2019 (9/67).  No additional new or enlarging lymph nodes. Esophagus: Within normal limits. Vessels: Ascending thoracic aorta is severely dilated measuring up to 48 mm, stable dating back to 2019. Normal caliber main pulmonary artery. Mild atherosclerotic calcifications of the thoracic aorta and its branches. Heart/Pericardium: Normal heart size. No significant pericardial effusion or thickening. Lungs/Pleura:  No focal consolidation. No new or suspicious pulmonary nodules. Stable 2 mm pulmonary nodule dating back to 2019 (18/387). No pleural effusions. Central Airways: Probable focal secretion/mucous plug dependently within the proximal thoracic trachea (15/21). Otherwise patent. ABDOMEN: Liver:  Unremarkable. Biliary system:  Cholelithiasis. Spleen:  Unremarkable. Pancreas:  Unremarkable. Adrenal glands:  Unremarkable. Right kidney:  Medial inferior pole lesion measuring 11 mm is stable in size dating back to 2018, with indeterminate increase in attenuation (~10-15 HU) on postcontrast scans; no definite associated enhancement on the most recent MRI. Posterior inferior pole simple renal cyst (Bosniak 1) measuring 1.9 cm. No hydroureteronephrosis. Left kidney: Postoperative changes of partial nephrectomy along the medial superior pole without new or enhancing nodularity. Small subcentimeter probable cysts which are too small to fully characterize. No hydroureteronephrosis. Stomach/Bowel: Unremarkable. Retroperitoneum/Mesentery: No mesenteric or retroperitoneal lymphadenopathy. Visualized pelvis: Unremarkable. Vessels:  Mild scattered atherosclerotic calcifications of the abdominal aorta and its branches. BONES/SOFT TISSUES:  No acute findings or suspicious lesions. Mild degenerative anterolisthesis of L4 on L5. Mild multilevel degenerative changes of the visualized spine.     Impression: IMPRESSION: 1.  Postoperative changes of left partial nephrectomy without local recurrence or metastatic disease. 2.  Gradually increased size of  a mildly enlarged prevascular mediastinal lymph node which measures 13 mm. This previously measured 10 mm in 2021 and 8 mm in 2019 but is indeterminate. No new or enlarging lymphadenopathy elsewhere. Attention on follow-up imaging. 3.  Stable size of an 11 mm lesion along the inferior pole of the right kidney which demonstrates attenuation increase which is indeterminate for enhancement. This did not demonstrate definite enhancement on the MRI from 2018. Consider further evaluation with ultrasound, or attention on follow-up. 4.  Probable focal secretion dependently within the proximal intrathoracic trachea. Attention on follow-up. 5.  Stable dilatation of the ascending thoracic aorta, measuring up to 48 mm. I, Attending Radiologist Jamie Freire MD, have reviewed the images and report and concur with these findings interpreted by Resident Radiologist, Yuly Schwartz MD.       Recent Labs   Lab 07/19/22  0852   Glucose 109*   Sodium 140   Potassium 4.4   Chloride 105   BUN 21*   Creatinine 0.94  0.91   Calcium 9.1   Albumin 3.7   GOT/AST 20   Alkaline Phosphatase 49   GPT 36   Anion Gap 14   BUN/ Creatinine Ratio 22   Globulin 3.1   A/G Ratio 1.2       WBC (K/mcL)   Date Value   07/19/2022 6.6     RBC (mil/mcL)   Date Value   07/19/2022 4.85     HCT (%)   Date Value   07/19/2022 45.8     HGB (g/dL)   Date Value   07/19/2022 14.8     PLT (K/mcL)   Date Value   07/19/2022 224         PSA, Total (ng/mL)   Date Value   12/10/2018 6.31 (H)   12/14/2017 6.25 (H)   06/20/2017 6.94 (H)   06/05/2017 7.05 (H)   10/19/2016 9.12 (H)   04/21/2016 11.16 (H)     Prostate Specific Antigen (ng/mL)   Date Value   07/19/2022 5.40     PSA = 4 at [place]    PAST MEDICAL HISTORY      Hypertension                                                  Elevated PSA                                                  ED (erectile dysfunction)                       01/01/2008    Hypogonadism male                                             Slowing of  urinary stream                                     Back pain                                                     Lower urinary tract symptoms (LUTS)             12/19/2017    Bilateral renal masses                          06/19/2018    RLS (restless legs syndrome)                    12/19/2018    Pancreatic cyst                                 11/27/2018      Comment: noted on MRI    History of colon polyps                         11/07/2016    High cholesterol                                              PAST SURGICAL HISTORY      PROSTATE BIOPSY                                 approx 20*      Comment: 87cc gland with no cancer in 12 cores    APPENDECTOMY                                    2013          COLONOSCOPY W/ POLYPECTOMY                      11/07/2016    GI ENDOSCOPIC ULTRASOUND                        02/18/2019      Comment: Dr. Coughlin: Multiple small pancreatic cysts in                close proximity to the main pancreatic duct                without any high-risk or worrisome features.                The overall appearance was suggestive of Side                Branch IPMNs. The larger of these cystic                lesions was sampled with FNA    NEPHRECTOMY                                     03/08/2019      Comment: robotic partial nephrectomy - Dr Taylor and Dr Fulton    TOTAL KNEE REPLACEMENT                          01/19/2022      Comment: Dr. Cabrera Cavazos    SOCIAL HISTORY  Social History     Tobacco Use   • Smoking status: Never Smoker   • Smokeless tobacco: Never Used   Substance Use Topics   • Alcohol use: No       Sexually Active: Not Asked          FAMILY HISTORY  Family History   Problem Relation Age of Onset   • Heart disease Mother    • Substance abuse Father         ETOH abuse   • Cancer Brother    • Cancer, Prostate Neg Hx        MEDICATIONS    Current Outpatient Medications   Medication Sig   • cephalexin (KEFLEX) 500 MG capsule Take 4 tabs 1 hour prior to any  dental procedure or invasive procedure where an antibiotic will not be prescribed.   • finasteride (PROSCAR) 5 MG tablet TAKE 1 TABLET BY MOUTH  DAILY   • tamsulosin (FLOMAX) 0.4 MG Cap TAKE 1 CAPSULE BY MOUTH  DAILY AFTER A MEAL   • meclizine (ANTIVERT) 25 MG tablet    • oxycodone-acetaminophen (PERCOCET) 7.5-325 MG per tablet Take 1 tablet by mouth every 4 hours as needed for Pain.   • pregabalin (LYRICA) 75 MG capsule Take 1 capsule by mouth 2 times daily for 7 days.   • apixaBAN (Eliquis) 2.5 MG Tab Take 1 tablet by mouth 2 times daily.   • naLOXone (NARCAN) 4 MG/0.1ML nasal spray Spray the content of 1 device into 1 nostril. Call 911. May repeat with 2nd device in alternate nostril if no response in 2-3 minutes.   • rOPINIRole (REQUIP) 0.5 MG tablet ROPINIROLE HCL 0.5 MG TABS   • Probiotic Product (PROBIOTIC PO) Take by mouth daily.   • rosuvastatin (CRESTOR) 10 MG tablet Take 10 mg by mouth daily.    • valsartan-hydroCHLOROthiazide (DIOVAN-HCT) 320-12.5 MG per tablet Take 1 tablet by mouth daily.      No current facility-administered medications for this visit.       ALLERGIES    ALLERGIES:  No Known Allergies    Review of Systems          PHYSICAL EXAM    Vital Signs: There were no vitals taken for this visit.   General: The patient is well developed, well nourished, in no acute distress, appears stated age.   Neurologic: Sensation and motor strength intact.  Gait normal.  Skin: Warm and dry.   Neck: Symmetric without swelling or tenderness.   Respiratory: Respiratory effort normal.     Extremities:  Right knee swelling and right lower extremity mild edema    Eyes: Conjunctiva, eye lids and pupils normal to inspection  Ears, nose, mouth and throat: Nasal mucosa, ears, airway and dentition normal to inspection.  Hearing intact  Psychiatric: Alert. Normal mood and affect                     ASSESSMENT  Bilateral renal masses - worrisome right renal mass lesion on CT and on correlative MRI worrisome left renal  lesion - On follow-up patient has worrisome left renal mass lesion which was enlarging.  Patient was managed with robot-assisted laparoscopic left partial nephrectomy for margin negative stage TIa renal cell carcinoma March 2019. This was a moderately complex partial nephrectomy related to tumor location and extensive reaction around the kidney    Elevated PSA - saw Dr Pozo - 4k score = 4 in the past and 2 previous negative biopsies with normal prostate MRI in 2021 and very large prostate with reassuring PSA density    Lower Urinary Tract Symptoms - voiding okay on maximal pharmacologic therapy with tamsulosin and finasteride - option of robot suprapubic prostatectomy, HoLEP and aqua beam reviewed    Pancreatic mass being followed by Dr. Joya    Mediastinal lymph node    Complex right renal cyst    Patient complains of right calf and knee pain.  He has had left knee replacement.  His urinary symptoms are not bothersome enough to him to consider surgical therapy at this time.  He saw Dr. Ray and has decided to not pursue penile prosthesis.    CT CHEST ABDOMEN    Result Date: 7/22/2022  Narrative: CT CHEST W ABDOMEN W WO CONTRAST HISTORY:   Urologic cancer, surveillance. History of left partial nephrectomy in March 2019 for renal cell carcinoma. COMPARISON: CT chest abdomen pelvis 7/19/2021 and 9/3/2019. MRI abdomen 11/27/2018. TECHNIQUE: Axial CT images of abdomen without and axial CT images of the of the chest and abdomen with 100 mL Omnipaque-300 IV contrast. Renal mass protocol for the abdomen . Multiplanar reformats. Axial MIP images of the chest. FINDINGS: Support Devices: None. CHEST: Neck base/Supraclavicular regions: Within normal limits. Vidhya/Mediastinum/Axillae:  Increased size of an 11 mm prevascular lymph node, previously 9 mm in 2021 and 8 mm in 2019 (9/67). No additional new or enlarging lymph nodes. Esophagus: Within normal limits. Vessels: Ascending thoracic aorta is severely dilated  measuring up to 48 mm, stable dating back to 2019. Normal caliber main pulmonary artery. Mild atherosclerotic calcifications of the thoracic aorta and its branches. Heart/Pericardium: Normal heart size. No significant pericardial effusion or thickening. Lungs/Pleura:  No focal consolidation. No new or suspicious pulmonary nodules. Stable 2 mm pulmonary nodule dating back to 2019 (18/387). No pleural effusions. Central Airways: Probable focal secretion/mucous plug dependently within the proximal thoracic trachea (15/21). Otherwise patent. ABDOMEN: Liver:  Unremarkable. Biliary system:  Cholelithiasis. Spleen:  Unremarkable. Pancreas:  Unremarkable. Adrenal glands:  Unremarkable. Right kidney:  Medial inferior pole lesion measuring 11 mm is stable in size dating back to 2018, with indeterminate increase in attenuation (~10-15 HU) on postcontrast scans; no definite associated enhancement on the most recent MRI. Posterior inferior pole simple renal cyst (Bosniak 1) measuring 1.9 cm. No hydroureteronephrosis. Left kidney: Postoperative changes of partial nephrectomy along the medial superior pole without new or enhancing nodularity. Small subcentimeter probable cysts which are too small to fully characterize. No hydroureteronephrosis. Stomach/Bowel: Unremarkable. Retroperitoneum/Mesentery: No mesenteric or retroperitoneal lymphadenopathy. Visualized pelvis: Unremarkable. Vessels:  Mild scattered atherosclerotic calcifications of the abdominal aorta and its branches. BONES/SOFT TISSUES:  No acute findings or suspicious lesions. Mild degenerative anterolisthesis of L4 on L5. Mild multilevel degenerative changes of the visualized spine.     Impression: IMPRESSION: 1.  Postoperative changes of left partial nephrectomy without local recurrence or metastatic disease. 2.  Gradually increased size of a mildly enlarged prevascular mediastinal lymph node which measures 13 mm. This previously measured 10 mm in 2021 and 8 mm in 2019  but is indeterminate. No new or enlarging lymphadenopathy elsewhere. Attention on follow-up imaging. 3.  Stable size of an 11 mm lesion along the inferior pole of the right kidney which demonstrates attenuation increase which is indeterminate for enhancement. This did not demonstrate definite enhancement on the MRI from 2018. Consider further evaluation with ultrasound, or attention on follow-up. 4.  Probable focal secretion dependently within the proximal intrathoracic trachea. Attention on follow-up. 5.  Stable dilatation of the ascending thoracic aorta, measuring up to 48 mm. I, Attending Radiologist Jamie Freire MD, have reviewed the images and report and concur with these findings interpreted by Resident Radiologist, Yuly Schwartz MD.       2021  IMPRESSION:     Marked prostate hypertrophy, but no suspicious lesion to suggest prostate  malignancy.    PLAN  Follow-up 6 months with CT chest and renal mass protocol CT    Refill finasteride and Flomax    Check stat Doppler of right lower extremity for right lower extremity swelling and patient needs follow-up with primary care physician and orthopedic surgeon if Doppler negative     send copy of note from today to Dr. Brenton Osorio - follow-up with Dr. Osorio     See Dr. Jamie Burgos for mediastinal lymphadenopathy      Follow-up for stage T1a and T1b renal cell carcinoma per National Cancer Care Network and American Urologic Association clinical guidelines      NCCN.org                  AUAnet.org    Guideline Statements  1. Patients undergoing follow-up for treated or observed renal masses should undergo a history and physical examination directed at detecting signs and symptoms of metastatic spread or local recurrence. (Clinical Principle)    2. Patients undergoing follow-up for treated or observed renal masses should undergo basic laboratory testing to include blood urea nitrogen (BUN)/creatinine, urine analysis (UA) and estimated glomerular filtration rate  (eGFR). Other laboratory evaluations, including complete blood count (CBC), lactate dehydrogenase (LDH), liver function tests (LFTs), alkaline phosphatase (ALP) and calcium level, may be used at the discretion of the clinician. (Expert Opinion)    3. Patients with progressive renal insufficiency on follow-up laboratory evaluation should be referred to nephrology. (Expert Opinion)    4. The Panel recommends a bone scan in patients with an elevated alkaline phosphatase (ALP), clinical symptoms such as bone pain, and/or if radiographic findings are suggestive of a bony neoplasm. (Recommendation; Evidence Strength. Grade C)    5. The Panel recommends against the performance of a bone scan in the absence of an elevated alkaline phosphatase (ALP) or clinical symptoms, such as bone pain, or radiographic findings suggestive of a bony neoplasm. (Recommendation; Evidence Strength. Grade C)    6. Patients with a history of a renal neoplasm presenting with acute neurological signs or symptoms must undergo prompt neurologic cross-sectional CT or MRI scanning of the head or spine based on localization of symptomatology. (Standard; Evidence Strength. Grade A)    7. The Panel recommends against the routine use of molecular markers, such Ki-67, p-53 and VEGF, as benefits remain unproven at this time. (Recommendation; Evidence Strength. Grade C)    Surgery. Low risk patients (pT1, N0, Nx):  8. Patients should undergo a baseline abdominal scan (CT or MRI) for nephron sparing surgery and abdominal imaging (US, CT or MRI) for radical nephrectomy within three to twelve months following renal surgery. (Expert Opinion)    9. Additional abdominal imaging (US, CT or MRI) may be performed in patients with low risk (pT1, N0, Nx) disease following a radical nephrectomy if the initial postoperative baseline image is negative. (Option; Evidence Strength. Grade C)    10. Abdominal imaging (US, CT, or MRI) may be performed yearly for three years in  patients with low risk (pT1, N0, Nx) disease following a partial nephrectomy based on individual risk factors if the initial postoperative scan is negative. (Option; Evidence Strength. Grade C)    11. The Panel recommends that patients with a history of low risk (pT1, N0, Nx) renal cell carcinoma undergo yearly chest x-ray (CXR) to assess for pulmonary metastases for three years and only as clinically indicated beyond that time period. (Recommendation; Evidence Strength. Grade C)

## 2022-08-10 ENCOUNTER — TELEPHONE (OUTPATIENT)
Dept: INTERNAL MEDICINE CLINIC | Facility: CLINIC | Age: 67
End: 2022-08-10

## 2022-08-10 NOTE — TELEPHONE ENCOUNTER
Received fax with attached eye exam. Abstracted. Placed in SD bin to review and sign off. Sending to scan once reviewed.

## 2022-08-22 ENCOUNTER — HOSPITAL ENCOUNTER (OUTPATIENT)
Dept: MAMMOGRAPHY | Age: 67
Discharge: HOME | End: 2022-08-22
Attending: NURSE PRACTITIONER
Payer: MEDICARE

## 2022-08-22 ENCOUNTER — HOSPITAL ENCOUNTER (OUTPATIENT)
Dept: GENERAL RADIOLOGY | Age: 67
Discharge: HOME | End: 2022-08-22
Attending: NURSE PRACTITIONER
Payer: MEDICARE

## 2022-08-22 DIAGNOSIS — R92.2 INCONCLUSIVE MAMMOGRAM: Primary | ICD-10-CM

## 2022-08-22 DIAGNOSIS — Z12.31 ENCOUNTER FOR SCREENING MAMMOGRAM FOR MALIGNANT NEOPLASM OF BREAST: ICD-10-CM

## 2022-08-22 DIAGNOSIS — R10.2 PELVIC PAIN: ICD-10-CM

## 2022-08-22 PROCEDURE — 72190 X-RAY EXAM OF PELVIS: CPT | Performed by: NURSE PRACTITIONER

## 2022-08-22 PROCEDURE — 77063 BREAST TOMOSYNTHESIS BI: CPT | Performed by: NURSE PRACTITIONER

## 2022-08-22 PROCEDURE — 77067 SCR MAMMO BI INCL CAD: CPT | Performed by: NURSE PRACTITIONER

## 2022-08-23 ENCOUNTER — PATIENT MESSAGE (OUTPATIENT)
Dept: INTERNAL MEDICINE CLINIC | Facility: CLINIC | Age: 67
End: 2022-08-23

## 2022-08-23 DIAGNOSIS — R10.2 PELVIC PAIN: Primary | ICD-10-CM

## 2022-08-23 DIAGNOSIS — M47.818 SI JOINT ARTHRITIS: ICD-10-CM

## 2022-08-23 NOTE — TELEPHONE ENCOUNTER
From: Beverly Combglen  To:  KRISTIN Mann  Sent: 8/23/2022 5:31 PM CDT  Subject: PT for pelvis    Please schedule Physical Therapy for Pelvis

## 2022-08-24 PROBLEM — M47.818 SI JOINT ARTHRITIS: Status: ACTIVE | Noted: 2022-08-24

## 2022-08-24 PROBLEM — M46.1 SI JOINT ARTHRITIS: Status: ACTIVE | Noted: 2022-08-24

## 2022-08-24 PROBLEM — M46.1 SI JOINT ARTHRITIS (HCC): Status: ACTIVE | Noted: 2022-08-24

## 2022-08-29 ENCOUNTER — TELEPHONE (OUTPATIENT)
Dept: INTERNAL MEDICINE CLINIC | Facility: CLINIC | Age: 67
End: 2022-08-29

## 2022-08-29 NOTE — TELEPHONE ENCOUNTER
Rossana from [place] [id number] called stating patient was there for mammo on 8/22 and US and addtl views were ordered and patient refusing to have them done-EMILY

## 2022-08-30 ENCOUNTER — TELEPHONE (OUTPATIENT)
Dept: PHYSICAL THERAPY | Facility: HOSPITAL | Age: 67
End: 2022-08-30

## 2022-09-01 ENCOUNTER — OFFICE VISIT (OUTPATIENT)
Dept: PHYSICAL THERAPY | Age: 67
End: 2022-09-01
Attending: NURSE PRACTITIONER
Payer: MEDICARE

## 2022-09-01 DIAGNOSIS — R10.2 PELVIC PAIN: ICD-10-CM

## 2022-09-01 DIAGNOSIS — M47.818 SI JOINT ARTHRITIS: ICD-10-CM

## 2022-09-01 PROCEDURE — 97161 PT EVAL LOW COMPLEX 20 MIN: CPT

## 2022-09-01 PROCEDURE — 97110 THERAPEUTIC EXERCISES: CPT

## 2022-09-09 ENCOUNTER — OFFICE VISIT (OUTPATIENT)
Dept: PHYSICAL THERAPY | Age: 67
End: 2022-09-09
Attending: NURSE PRACTITIONER
Payer: MEDICARE

## 2022-09-09 PROCEDURE — 97110 THERAPEUTIC EXERCISES: CPT

## 2022-09-12 ENCOUNTER — TELEPHONE (OUTPATIENT)
Dept: PHYSICAL THERAPY | Age: 67
End: 2022-09-12

## 2022-09-12 ENCOUNTER — APPOINTMENT (OUTPATIENT)
Dept: PHYSICAL THERAPY | Age: 67
End: 2022-09-12
Attending: NURSE PRACTITIONER
Payer: MEDICARE

## 2022-09-12 ENCOUNTER — TELEPHONE (OUTPATIENT)
Dept: PHYSICAL THERAPY | Facility: HOSPITAL | Age: 67
End: 2022-09-12

## 2022-09-15 ENCOUNTER — APPOINTMENT (OUTPATIENT)
Dept: PHYSICAL THERAPY | Age: 67
End: 2022-09-15
Attending: NURSE PRACTITIONER
Payer: MEDICARE

## 2022-09-20 ENCOUNTER — OFFICE VISIT (OUTPATIENT)
Dept: PHYSICAL THERAPY | Age: 67
End: 2022-09-20
Attending: NURSE PRACTITIONER
Payer: MEDICARE

## 2022-09-20 PROCEDURE — 97110 THERAPEUTIC EXERCISES: CPT

## 2022-09-23 ENCOUNTER — TELEPHONE (OUTPATIENT)
Dept: PHYSICAL THERAPY | Facility: HOSPITAL | Age: 67
End: 2022-09-23

## 2022-09-23 ENCOUNTER — APPOINTMENT (OUTPATIENT)
Dept: PHYSICAL THERAPY | Age: 67
End: 2022-09-23
Attending: NURSE PRACTITIONER
Payer: MEDICARE

## 2022-09-26 ENCOUNTER — APPOINTMENT (OUTPATIENT)
Dept: PHYSICAL THERAPY | Age: 67
End: 2022-09-26
Attending: NURSE PRACTITIONER
Payer: MEDICARE

## 2022-09-29 ENCOUNTER — APPOINTMENT (OUTPATIENT)
Dept: PHYSICAL THERAPY | Age: 67
End: 2022-09-29
Attending: NURSE PRACTITIONER
Payer: MEDICARE

## 2022-12-08 ENCOUNTER — TELEPHONE (OUTPATIENT)
Dept: INTERNAL MEDICINE CLINIC | Facility: CLINIC | Age: 67
End: 2022-12-08

## 2022-12-08 NOTE — TELEPHONE ENCOUNTER
Unable to leave a message vm not set up.   Message sent via [employer] to schedule Supervisit for 2023

## 2023-05-08 ENCOUNTER — TELEPHONE (OUTPATIENT)
Dept: INTERNAL MEDICINE CLINIC | Facility: CLINIC | Age: 68
End: 2023-05-08

## 2023-05-08 NOTE — TELEPHONE ENCOUNTER
H# no VM set up   LMTCB on cell to schedule Supervisit   Letter sent via [employer] and printed to be mailed home

## 2023-11-15 ENCOUNTER — TELEPHONE (OUTPATIENT)
Dept: INTERNAL MEDICINE CLINIC | Facility: CLINIC | Age: 68
End: 2023-11-15

## 2023-11-15 DIAGNOSIS — Z12.83 SKIN CANCER SCREENING: Primary | ICD-10-CM

## 2023-11-15 DIAGNOSIS — E11.65 UNCONTROLLED TYPE 2 DIABETES MELLITUS WITH HYPERGLYCEMIA (HCC): ICD-10-CM

## 2023-11-15 DIAGNOSIS — Z00.00 ROUTINE GENERAL MEDICAL EXAMINATION AT A HEALTH CARE FACILITY: Primary | ICD-10-CM

## 2023-11-15 DIAGNOSIS — E78.5 DYSLIPIDEMIA: ICD-10-CM

## 2023-11-15 NOTE — TELEPHONE ENCOUNTER
LOV 6/17/22    Combined encounters. Requesting derm for skin ca screening and ophthalmology for DM eye exam. Pended.      Future Appointments   Date Time Provider Bj Garcia   12/26/2023  1:40 PM KRISTIN Craig [facility]

## 2023-11-15 NOTE — TELEPHONE ENCOUNTER
Specialty:   derm    Full Name of Specialist:  Carlos Arellano    Name of the Provider Group:  [facility] derm  Address:  Phone number:  Fax number :  NPI:    (NPI number of the service provider. the nurses need this information for the referral.  Its for service providers only like Surgery*, [employer], [facility], Etc. We not NOT need physician NPI's)    *Surgery: The NPI # should be of the location of the Surgery.     Date of Appointment:  not sched et    Reason for the Appointment (be specific with diagnosis code):  yearly skin check    Has the patient seen a provider in our office for stated problem?: yes    Is this request for an out of network referral? no  (if yes, please have patient contact referring provider and have them fax office visit notes to triage attention):

## 2023-11-15 NOTE — TELEPHONE ENCOUNTER
Future Appointments   Date Time Provider Bj Garcia   12/26/2023  1:40 PM Tyrone Ferreira, APRN [facility]     Orders to edward-Pt informed that labs need to be completed no sooner than 2 weeks prior to the appt.  Pt aware to fast-no call back required

## 2023-11-15 NOTE — TELEPHONE ENCOUNTER
Specialty:    opthalmology    Full Name of Specialist:  Alejandro Cardinal    Name of the Provider Group:  [facility]  Address:  Phone number:  Fax number :  NPI:    (NPI number of the service provider. the nurses need this information for the referral.  Its for service providers only like Surgery*, [employer], [facility] [facility], Etc. We not NOT need physician NPI's)    *Surgery: The NPI # should be of the location of the Surgery.     Date of Appointment:   not made yet    Reason for the Appointment (be specific with diagnosis code):  yearly DM exam    Has the patient seen a provider in our office for stated problem?:  yes    Is this request for an out of network referral? no  (if yes, please have patient contact referring provider and have them fax office visit notes to triage attention):

## 2023-12-07 ENCOUNTER — TELEPHONE (OUTPATIENT)
Dept: INTERNAL MEDICINE CLINIC | Facility: CLINIC | Age: 68
End: 2023-12-07

## 2023-12-07 DIAGNOSIS — Z01.10 ENCOUNTER FOR HEARING EXAMINATION, UNSPECIFIED WHETHER ABNORMAL FINDINGS: Primary | ICD-10-CM

## 2023-12-07 DIAGNOSIS — E11.9 TYPE 2 DIABETES MELLITUS WITHOUT COMPLICATION, WITHOUT LONG-TERM CURRENT USE OF INSULIN (HCC): ICD-10-CM

## 2023-12-07 NOTE — TELEPHONE ENCOUNTER
Specialty:  Audiology    Full Name of Specialist:    Name of the Provider Group:  [facility]  Address:  [address] [place] [address]. [address] [place] [address], [facility], [address] [place] [address]  Phone number:  [phone]  Fax number :   [phone]  NPI: [id number]    (NPI number of the service provider. the nurses need this information for the referral.  Its for service providers only like Surgery*, [employer], [facility] [facility], Etc. We not NOT need physician NPI's)    *Surgery: The NPI # should be of the location of the Surgery.     Date of Appointment:  12/13/23    Reason for the Appointment (be specific with diagnosis code):  Routine hearing screening    Has the patient seen a provider in our office for stated problem?: no    Is this request for an out of network referral?   (if yes, please have patient contact referring provider and have them fax office visit notes to triage attention):

## 2023-12-08 RX ORDER — BLOOD-GLUCOSE METER
EACH MISCELLANEOUS
Qty: 1 KIT | Refills: 0 | Status: SHIPPED | OUTPATIENT
Start: 2023-12-08

## 2023-12-08 NOTE — TELEPHONE ENCOUNTER
Requested Prescriptions     Pending Prescriptions Disp Refills    Blood Glucose Monitoring Suppl (ACCU-CHEK GUIDE) w/Device Does not apply Kit 1 kit 0     Sig: Use to check blood sugars BID.  Please include appropriate lancets covered by insurance x 100       LOV:6/17/22 SD    LAST CPE:6/17/22 SD    Last Labs:6/14/22 tsh,lipid,A1c,cmp  Active orders from 12/7/23     Last Refill:2/25/19 (1 kit, 0 refills)    Your appointments       Date & Time Appointment Department [facility])    Dec 26, 2023  1:40 PM CST [facility] [place]              [facility]  [facility] [employer] IM/[facility] [place] [address] [place] [phone]

## 2023-12-12 NOTE — TELEPHONE ENCOUNTER
\"So this is going to be Denied. I just ran one for her  for the same Provider MARY JANE and it is OON w/[employer]. Please have the pt call [place] to get the name of an [facility] Provider. TY! \" From [facility]       Attempted to call pt, left VM     PSR - Upon pt call back. .. Please see message above. Referral denied.  Will need to call [facility] and find in network audiologist

## 2023-12-22 ENCOUNTER — LAB ENCOUNTER (OUTPATIENT)
Dept: LAB | Age: 68
End: 2023-12-22
Attending: NURSE PRACTITIONER
Payer: MEDICARE

## 2023-12-22 DIAGNOSIS — Z00.00 ROUTINE GENERAL MEDICAL EXAMINATION AT A HEALTH CARE FACILITY: ICD-10-CM

## 2023-12-22 DIAGNOSIS — E78.5 DYSLIPIDEMIA: ICD-10-CM

## 2023-12-22 DIAGNOSIS — E11.65 UNCONTROLLED TYPE 2 DIABETES MELLITUS WITH HYPERGLYCEMIA (HCC): ICD-10-CM

## 2023-12-22 LAB
ALBUMIN SERPL-MCNC: 3.7 G/DL (ref 3.4–5)
ALBUMIN/GLOB SERPL: 1.3 {RATIO} (ref 1–2)
ALP LIVER SERPL-CCNC: 82 U/L
ALT SERPL-CCNC: 25 U/L
ANION GAP SERPL CALC-SCNC: 3 MMOL/L (ref 0–18)
AST SERPL-CCNC: 19 U/L (ref 15–37)
BASOPHILS # BLD AUTO: 0.06 X10(3) UL (ref 0–0.2)
BASOPHILS NFR BLD AUTO: 0.9 %
BILIRUB SERPL-MCNC: 0.7 MG/DL (ref 0.1–2)
BUN BLD-MCNC: 10 MG/DL (ref 9–23)
CALCIUM BLD-MCNC: 8.8 MG/DL (ref 8.5–10.1)
CHLORIDE SERPL-SCNC: 104 MMOL/L (ref 98–112)
CHOLEST SERPL-MCNC: 246 MG/DL (ref ?–200)
CO2 SERPL-SCNC: 30 MMOL/L (ref 21–32)
CREAT BLD-MCNC: 0.95 MG/DL
CREAT UR-SCNC: 170 MG/DL
EGFRCR SERPLBLD CKD-EPI 2021: 64 ML/MIN/1.73M2 (ref 60–?)
EOSINOPHIL # BLD AUTO: 0.07 X10(3) UL (ref 0–0.7)
EOSINOPHIL NFR BLD AUTO: 1.1 %
ERYTHROCYTE [DISTWIDTH] IN BLOOD BY AUTOMATED COUNT: 12 %
EST. AVERAGE GLUCOSE BLD GHB EST-MCNC: 298 MG/DL (ref 68–126)
FASTING PATIENT LIPID ANSWER: YES
FASTING STATUS PATIENT QL REPORTED: YES
GLOBULIN PLAS-MCNC: 2.9 G/DL (ref 2.8–4.4)
GLUCOSE BLD-MCNC: 271 MG/DL (ref 70–99)
HBA1C MFR BLD: 12 % (ref ?–5.7)
HCT VFR BLD AUTO: 43.7 %
HDLC SERPL-MCNC: 54 MG/DL (ref 40–59)
HGB BLD-MCNC: 14.3 G/DL
IMM GRANULOCYTES # BLD AUTO: 0.02 X10(3) UL (ref 0–1)
IMM GRANULOCYTES NFR BLD: 0.3 %
LDLC SERPL CALC-MCNC: 166 MG/DL (ref ?–100)
LYMPHOCYTES # BLD AUTO: 2.06 X10(3) UL (ref 1–4)
LYMPHOCYTES NFR BLD AUTO: 31.1 %
MCH RBC QN AUTO: 29.1 PG (ref 26–34)
MCHC RBC AUTO-ENTMCNC: 32.7 G/DL (ref 31–37)
MCV RBC AUTO: 88.8 FL
MICROALBUMIN UR-MCNC: 2.46 MG/DL
MICROALBUMIN/CREAT 24H UR-RTO: 14.5 UG/MG (ref ?–30)
MONOCYTES # BLD AUTO: 0.42 X10(3) UL (ref 0.1–1)
MONOCYTES NFR BLD AUTO: 6.3 %
NEUTROPHILS # BLD AUTO: 3.99 X10 (3) UL (ref 1.5–7.7)
NEUTROPHILS # BLD AUTO: 3.99 X10(3) UL (ref 1.5–7.7)
NEUTROPHILS NFR BLD AUTO: 60.3 %
NONHDLC SERPL-MCNC: 192 MG/DL (ref ?–130)
OSMOLALITY SERPL CALC.SUM OF ELEC: 293 MOSM/KG (ref 275–295)
PLATELET # BLD AUTO: 259 10(3)UL (ref 150–450)
POTASSIUM SERPL-SCNC: 4.1 MMOL/L (ref 3.5–5.1)
PROT SERPL-MCNC: 6.6 G/DL (ref 6.4–8.2)
RBC # BLD AUTO: 4.92 X10(6)UL
SODIUM SERPL-SCNC: 137 MMOL/L (ref 136–145)
TRIGL SERPL-MCNC: 146 MG/DL (ref 30–149)
TSI SER-ACNC: 2.63 MIU/ML (ref 0.36–3.74)
VLDLC SERPL CALC-MCNC: 29 MG/DL (ref 0–30)
WBC # BLD AUTO: 6.6 X10(3) UL (ref 4–11)

## 2023-12-22 PROCEDURE — 83036 HEMOGLOBIN GLYCOSYLATED A1C: CPT

## 2023-12-22 PROCEDURE — 85025 COMPLETE CBC W/AUTO DIFF WBC: CPT

## 2023-12-22 PROCEDURE — 80061 LIPID PANEL: CPT

## 2023-12-22 PROCEDURE — 84443 ASSAY THYROID STIM HORMONE: CPT

## 2023-12-22 PROCEDURE — 82570 ASSAY OF URINE CREATININE: CPT

## 2023-12-22 PROCEDURE — 80053 COMPREHEN METABOLIC PANEL: CPT

## 2023-12-22 PROCEDURE — 82043 UR ALBUMIN QUANTITATIVE: CPT

## 2023-12-22 PROCEDURE — 36415 COLL VENOUS BLD VENIPUNCTURE: CPT

## 2023-12-30 DIAGNOSIS — E11.9 TYPE 2 DIABETES MELLITUS WITHOUT COMPLICATION, WITHOUT LONG-TERM CURRENT USE OF INSULIN (HCC): ICD-10-CM

## 2024-01-02 RX ORDER — GLIMEPIRIDE 2 MG/1
2 TABLET ORAL 2 TIMES DAILY
Qty: 60 TABLET | Refills: 0 | Status: SHIPPED | OUTPATIENT
Start: 2024-01-02 | End: 2024-02-01

## 2024-01-02 RX ORDER — BLOOD SUGAR DIAGNOSTIC
STRIP MISCELLANEOUS
Qty: 100 STRIP | Refills: 1 | Status: SHIPPED | OUTPATIENT
Start: 2024-01-02

## 2024-01-05 NOTE — TELEPHONE ENCOUNTER
Pt stated several relatives tested positive for the flu she will call back to schedule. See other TE in result bucket

## 2024-01-08 ENCOUNTER — TELEPHONE (OUTPATIENT)
Dept: INTERNAL MEDICINE CLINIC | Facility: CLINIC | Age: 69
End: 2024-01-08

## 2024-01-08 NOTE — TELEPHONE ENCOUNTER
Future Appointments   Date Time Provider Department Center   2/7/2024  1:30 PM Lexus Lopes, KRISTIN [facility]     Orders to edward- Pt informed that labs need to be completed no sooner than 2 weeks prior to the appt. Pt aware to fast-no call back required

## 2024-02-03 NOTE — PROGRESS NOTES
HPI:   Darryle Spiro is a 77year old female who presents for a MA (Medicare Advantage) Supervisit (Once per calendar year). Encounter for annual health examination- due for mammogram and dexa, she declined prevnar vaccine.  She is due for colon c General (Internal Medicine)    Patient Active Problem List:     DM (diabetes mellitus) ([facility] [address]     Dyslipidemia    Wt Readings from Last 3 Encounters:  06/30/20 : 135 lb 6.4 oz (61.4 kg)  01/07/20 : 133 lb (60.3 kg)  12/20/19 : 138 lb 4 oz (62.7 kg)     Last 5 (moderate pain) hyperlipidemia, and Type II or unspecified type diabetes mellitus without mention of complication, not stated as uncontrolled.     She  has a past surgical history that includes total abdom hysterectomy; other surgical history; other surgical history; other tenderness/mass/nodules; no carotid bruit or JVD   Back:   Symmetric, no curvature, ROM normal, no CVA tenderness   Lungs:   Clear to auscultation bilaterally, respirations unlabored   Heart:  Regular rate and rhythm, S1 and S2 normal   Abdomen:   Soft, no 7  Referred for eye exam. Foot exam done today  -     HEMOGLOBIN A1C; Future  -     BASIC METABOLIC PANEL (8); Future  Screening for skin cancer  -     DERM - INTERNAL  Post-menopausal  -     XR DEXA BONE DENSITOMETRY (CPT=77080);  Future  Screen for colon Fecal Occult Blood Annually No results found for: FOB No flowsheet data found.     Glaucoma Screening      Ophthalmology Visit Annually: Diabetics, FHx Glaucoma, AA>50, > 65 Data entered on: 3/4/2019   Last Dilated Eye Exam 3/4/2019       Bone Densi 5 (moderate pain) Lab or Procedure         Diabetes      HgbA1C  Annually HEMOGLOBIN A1C (%)   Date Value   12/20/2019 6.9 (A)     HgbA1C (%)   Date Value   06/29/2020 6.8 (H)       No flowsheet data found.     Creat/alb ratio  Annually Malb/Cre Calc (ug/mg)   Date Value   0

## 2024-02-07 ENCOUNTER — OFFICE VISIT (OUTPATIENT)
Dept: INTERNAL MEDICINE CLINIC | Facility: CLINIC | Age: 69
End: 2024-02-07
Payer: MEDICARE

## 2024-02-07 VITALS
WEIGHT: 121 LBS | HEART RATE: 75 BPM | RESPIRATION RATE: 18 BRPM | OXYGEN SATURATION: 98 % | DIASTOLIC BLOOD PRESSURE: 80 MMHG | BODY MASS INDEX: 22.26 KG/M2 | TEMPERATURE: 98 F | HEIGHT: 62 IN | SYSTOLIC BLOOD PRESSURE: 120 MMHG

## 2024-02-07 DIAGNOSIS — E78.5 DYSLIPIDEMIA: ICD-10-CM

## 2024-02-07 DIAGNOSIS — Z00.00 ROUTINE GENERAL MEDICAL EXAMINATION AT A HEALTH CARE FACILITY: Primary | ICD-10-CM

## 2024-02-07 DIAGNOSIS — R92.2 INCONCLUSIVE MAMMOGRAM: ICD-10-CM

## 2024-02-07 DIAGNOSIS — R03.0 ELEVATED BP WITHOUT DIAGNOSIS OF HYPERTENSION: ICD-10-CM

## 2024-02-07 DIAGNOSIS — E11.42 DIABETIC POLYNEUROPATHY ASSOCIATED WITH TYPE 2 DIABETES MELLITUS (HCC): ICD-10-CM

## 2024-02-07 DIAGNOSIS — Z12.31 ENCOUNTER FOR SCREENING MAMMOGRAM FOR MALIGNANT NEOPLASM OF BREAST: ICD-10-CM

## 2024-02-07 DIAGNOSIS — M81.0 AGE-RELATED OSTEOPOROSIS WITHOUT CURRENT PATHOLOGICAL FRACTURE: ICD-10-CM

## 2024-02-07 DIAGNOSIS — E11.65 UNCONTROLLED TYPE 2 DIABETES MELLITUS WITH HYPERGLYCEMIA (HCC): ICD-10-CM

## 2024-02-07 RX ORDER — PEN NEEDLE, DIABETIC 32GX 5/32"
1 NEEDLE, DISPOSABLE MISCELLANEOUS DAILY
Qty: 100 EACH | Refills: 0 | Status: SHIPPED | OUTPATIENT
Start: 2024-02-07 | End: 2024-02-07

## 2024-02-07 RX ORDER — INSULIN GLARGINE 300 U/ML
10 INJECTION, SOLUTION SUBCUTANEOUS DAILY
Qty: 1 EACH | Refills: 0 | Status: SHIPPED | OUTPATIENT
Start: 2024-02-07

## 2024-02-07 RX ORDER — INSULIN GLARGINE 300 U/ML
10 INJECTION, SOLUTION SUBCUTANEOUS DAILY
Qty: 1 EACH | Refills: 0 | Status: SHIPPED | OUTPATIENT
Start: 2024-02-07 | End: 2024-02-07

## 2024-02-07 RX ORDER — PEN NEEDLE, DIABETIC 32GX 5/32"
1 NEEDLE, DISPOSABLE MISCELLANEOUS DAILY
Qty: 100 EACH | Refills: 0 | Status: SHIPPED | OUTPATIENT
Start: 2024-02-07 | End: 2025-02-06

## 2024-02-07 NOTE — PROGRESS NOTES
Subjective:   Lexus Figueroa is a 68 year old female who presents for a MA (Medicare Advantage) Supervisit (Once per calendar year) and Here for AWV and follow up of chronic health issues.  . Her weight is down.  She states purposeful.  Overdue for additional views of right breast in 2022.  Will order diagnostic mammogram.      Last ov 6/22   prior to that 3/21  discussed need to see her more frequently due to chronic medical conditions.  Due for mammogram and colon cancer screening.  Defers immunizations.  discussed with patient what her expectations are of us in relationship to her health.    She is open to discussing options.      Diabetes.  Glimepiride 2mg.  Metformin  both caused unwanted side effects so she has stopped.  Not taking any medications for her diabetes.  Glucose 271with A1c 12.0 from 8.5 in 6.22.    discussed options.  Finally opted to try long acting insulin for less side effects.  Demonstration done.       Dyslipidemia.  Total 246 with  statin has been recommended on multiple occasions.  Patient continues to defer statin start. Discussed at-length risks associated with hyperlipidemia particularly with diabetes.     Osteoporosis.  Last dexa 2020.  -2.9 lumbar spine.  Defers meds.      History/Other:   Fall Risk Assessment:   She has been screened for Falls and is low risk.      Cognitive Assessment:   She had a completely normal cognitive assessment - see flowsheet entries       Functional Ability/Status:   Lexus Figueroa has some abnormal functions as listed below:  She has Driving difficulties based on screening of functional status.       Depression Screening (PHQ-2/PHQ-9): PHQ-2 SCORE: 2  , done 2/6/2024   Little interest or pleasure in doing things: 1    Feeling down, depressed, or hopeless: 1    Last [place] Suicide Screening on 2/7/2024 was No Risk.         Advanced Directives:   She does have a Living Will but we do NOT have it on file in Epic.    She does NOT have a Power of   for Health Care. [Do you have a healthcare power of ?: No]  Not discussed      Patient Active Problem List   Diagnosis    Uncontrolled type 2 diabetes mellitus with hyperglycemia (HCC)    Dyslipidemia    Age-related osteoporosis without current pathological fracture    Vitamin D deficiency    Elevated BP without diagnosis of hypertension    Actinic keratosis    Diabetic polyneuropathy associated with type 2 diabetes mellitus (HCC)    SI joint arthritis     Allergies:  She is allergic to morphine, ace inhibitors, prednisone, and sulfa antibiotics.    Current Medications:  Outpatient Medications Marked as Taking for the 2/7/24 encounter (Office Visit) with Lexus Lopes APRN   Medication Sig    Insulin Glargine, 1 Unit Dial, (TOUJEO SOLOSTAR) 300 UNIT/ML Subcutaneous Solution Pen-injector Inject 10 Units into the skin daily.    Insulin Pen Needle (BD PEN NEEDLE ANNA U/F) 32G X 4 MM Does not apply Misc Inject 1 Pen into the skin daily.    Glucose Blood (ACCU-CHEK GUIDE) In Vitro Strip USE 1 STRIP TO CHECK BLOOD SUGARS  DAILY    Blood Glucose Monitoring Suppl (ACCU-CHEK GUIDE) w/Device Does not apply Kit Use to check blood sugars BID. Please include appropriate lancets covered by insurance x 100    TURMERIC OR Take by mouth.    Cholecalciferol (VITAMIN D3) 1000 units Oral Cap Take 1 tablet by mouth daily.    CINNAMON OR Take by mouth.    vitamin E 100 UNITS Oral Cap Take 1 capsule (100 Units total) by mouth daily.    COD LIVER OIL OR Take by mouth.       Medical History:  She  has a past medical history of Allergic rhinitis, Arthritis, Diabetes mellitus (HCC), Other and unspecified hyperlipidemia, and Type II or unspecified type diabetes mellitus without mention of complication, not stated as uncontrolled.  Surgical History:  She  has a past surgical history that includes total abdom hysterectomy; other surgical history; other surgical history; other surgical history; lisy localization wire 1 site left  (cpt=19281) (2014); hysterectomy; lisy localization wire 1 site right (cpt=19281) (2016); d & c; ; and tubal ligation.   Family History:  Her family history includes Cancer in her sister; Cancer (age of onset: 68) in her father; Colon Cancer in her maternal grandmother; Diabetes in her brother; Heart Disorder in her mother; No Known Problems in her maternal grandfather, paternal grandfather, and paternal grandmother.  Social History:  She  reports that she has never smoked. She has never used smokeless tobacco. She reports that she does not drink alcohol and does not use drugs.    Tobacco:  She has never smoked tobacco.    CAGE Alcohol Screen:   CAGE screening score of 0 on 2024, showing low risk of alcohol abuse.      Patient Care Team:  Elizabeth Eugene MD as PCP - General (Internal Medicine)  Antonio Alston MD (OPHTHALMOLOGY)  Jurgen Dunlap PT as Physical Therapist (Physical Therapy)    Review of Systems     Negative except as above     Objective:   Physical Exam  Physical Exam  General Appearance:  Alert, cooperative, no distress, appears stated age   Head:  Normocephalic, without obvious abnormality, atraumatic   Eyes:  PERRL, conjunctiva/corneas clear, EOM's intact both eyes   Ears:  Normal TM's and external ear canals, both ears   Nose: Nares normal, septum midline,mucosa normal, no drainage or sinus tenderness   Throat: Lips, mucosa, and tongue normal; teeth and gums normal   Neck: Supple, symmetrical, trachea midline, no adenopathy;  thyroid: not enlarged, symmetric, no JVD   Back:   Symmetric, no curvature, ROM normal, no CVA tenderness   Lungs:   Clear to auscultation bilaterally, respirations unlabored   Heart:  Regular rate and rhythm, S1 and S2 normal,  murmur   Abdomen:   Soft, non-tender, bowel sounds active all four quadrants,  no masses,    Extremities: Extremities normal, atraumatic, no edema   Pulses: symmetric   Skin: Skin color, texture, turgor normal,    Lymph nodes: Cervical,  supraclavicular nodes normal   Neurologic: Normal             /80   Pulse 75   Temp 98 °F (36.7 °C) (Temporal)   Resp 18   Ht 5' 2\" (1.575 m)   Wt 121 lb (54.9 kg)   LMP  (LMP Unknown)   SpO2 98%   BMI 22.13 kg/m²  Estimated body mass index is 22.13 kg/m² as calculated from the following:    Height as of this encounter: 5' 2\" (1.575 m).    Weight as of this encounter: 121 lb (54.9 kg).    Medicare Hearing Assessment:   Hearing Screening    Time taken: 2/7/2024  1:37 PM  Entry User: Lennie Stoll CMA  Screening Method: Finger Rub  Finger Rub Result: Pass               Visual Acuity:   Right Eye Visual Acuity: Uncorrected Right Eye Chart Acuity: 20/20   Left Eye Visual Acuity: Uncorrected Left Eye Chart Acuity: 20/20   Both Eyes Visual Acuity: Uncorrected Both Eyes Chart Acuity: 20/20   Able To Tolerate Visual Acuity: Yes        Assessment & Plan:   Lexus Figueroa is a 68 year old female who presents for a Medicare Assessment.     1. Routine general medical examination at a health care facility (Primary)  2. Uncontrolled type 2 diabetes mellitus with hyperglycemia (HCC)  states eye exam completed.  She agrees to basal insulin.  Will start slow and titrate slowly.  She does check her glucoes regularly.  She will follow up with me virtually.  Will send to local pharmacy for coverage and ultimately she would like sent to mail order.  Will f/u with me virtually in 6 weeks.    3. Diabetic polyneuropathy associated with type 2 diabetes mellitus (HCC)  agreed to insulin  as above.   4. Dyslipidemia  defers statin.   5. Elevated BP without diagnosis of hypertension  stable today.   6. Age-related osteoporosis without current pathological fracture  defers meds.  Continue weight bearing exercises and vit d supplement.  Cont vit d supplement.    7. Inconclusive mammogram.    -     [facility] 2D+3D diagnostic ordered.   Other orders  -     Discontinue: Toujeo SoloStar; Inject 10 Units into the skin daily.   Dispense: 1 each; Refill: 0  -     Discontinue: BD Pen Needle Ernestina U/F; Inject 1 Pen into the skin daily.  Dispense: 100 each; Refill: 0  -     Toujeo SoloStar; Inject 10 Units into the skin daily.  Dispense: 1 each; Refill: 0  -     BD Pen Needle Ernestina U/F; Inject 1 Pen into the skin daily.  Dispense: 100 each; Refill: 0    The patient indicates understanding of these issues and agrees to the plan.  Reinforced healthy diet, lifestyle, and exercise.      No follow-ups on file.     KRISTIN Carmichael, 2/7/2024     Supplementary Documentation:   General Health:  In the past six months, have you lost more than 10 pounds without trying?: 2 - No  Has your appetite been poor?: No  Type of Diet: Balanced;Diabetic  How does the patient maintain a good energy level?: Stretching  How would you describe your daily physical activity?: Moderate  How would you describe your current health state?: Good  How do you maintain positive mental well-being?: Social Interaction;Games;Visiting Family  On a scale of 0 to 10, with 0 being no pain and 10 being severe pain, what is your pain level?: 0 - (None)  In the past six months, have you experienced urine leakage?: 0-No  At any time do you feel concerned for the safety/well-being of yourself and/or your children, in your home or elsewhere?: No  Have you had any immunizations at another office such as Influenza, Hepatitis B, Tetanus, or Pneumococcal?: No       Lxeus Figueroa's SCREENING SCHEDULE   Tests on this list are recommended by your physician but may not be covered, or covered at this frequency, by your insurer.   Please check with your insurance carrier before scheduling to verify coverage.   PREVENTATIVE SERVICES FREQUENCY &  COVERAGE DETAILS LAST COMPLETION DATE   Diabetes Screening    Fasting Blood Sugar /  Glucose    One screening every 12 months if never tested or if previously tested but not diagnosed with pre-diabetes   One screening every 6 months if diagnosed with  pre-diabetes Lab Results   Component Value Date     (H) 12/22/2023        Cardiovascular Disease Screening    Lipid Panel  Cholesterol  Lipoprotein (HDL)  Triglycerides Covered every 5 years for all Medicare beneficiaries without apparent signs or symptoms of cardiovascular disease Lab Results   Component Value Date    CHOLEST 246 (H) 12/22/2023    HDL 54 12/22/2023     (H) 12/22/2023    TRIG 146 12/22/2023         Electrocardiogram (EKG)   Covered if needed at Welcome to Medicare, and non-screening if indicated for medical reasons 06/15/2016      Ultrasound Screening for Abdominal Aortic Aneurysm (AAA) Covered once in a lifetime for one of the following risk factors    Men who are 65-75 years old and have ever smoked    Anyone with a family history -     Colorectal Cancer Screening  Covered for ages 50-85; only need ONE of the following:    Colonoscopy   Covered every 10 years    Covered every 2 years if patient is at high risk or previous colonoscopy was abnormal -    Health Maintenance   Topic Date Due    Colorectal Cancer Screening  07/13/2021       Flexible Sigmoidoscopy   Covered every 4 years -    Fecal Occult Blood Test Covered annually -   Bone Density Screening    Bone density screening    Covered every 2 years after age 65 if diagnosed with risk of osteoporosis or estrogen deficiency.    Covered yearly for long-term glucocorticoid medication use (Steroids) Last Dexa Scan:    XR DEXA BONE DENSITOMETRY (CPT=77080) 09/01/2020      No recommendations at this time   Pap and Pelvic    Pap   Covered every 2 years for women at normal risk; Annually if at high risk -  No recommendations at this time    Chlamydia Annually if high risk -  No recommendations at this time   Screening Mammogram    Mammogram     Recommend annually for all female patients aged 40 and older    One baseline mammogram covered for patients aged 35-39 08/22/2022    Health Maintenance   Topic Date Due    Mammogram  08/22/2023        Immunizations    Influenza Covered once per flu season  Please get every year -  Influenza Vaccine(1) Never done    Pneumococcal Each vaccine (Lveoytb03 & Jlxdmfhcu04) covered once after 65 Prevnar 13: -    Iteiytrjq98: -     Pneumococcal Vaccination(1 of 2 - PCV) Never done    Hepatitis B One screening covered for patients with certain risk factors   -  No recommendations at this time    Tetanus Toxoid Not covered by Medicare Part B unless medically necessary (cut with metal); may be covered with your pharmacy prescription benefits -    Tetanus, Diptheria and Pertusis TD and TDaP Not covered by Medicare Part B -  No recommendations at this time    Zoster Not covered by Medicare Part B; may be covered with your pharmacy  prescription benefits -  Zoster Vaccines(1 of 2) Never done     Diabetes      Hemoglobin A1C Annually; if last result is elevated, may be asked to retest more frequently.    Medicare covers every 3 months Lab Results   Component Value Date     (H) 12/22/2023    A1C 12.0 (H) 12/22/2023       No recommendations at this time    Creat/alb ratio Annually Lab Results   Component Value Date    MICROALBCREA 14.5 12/22/2023       LDL Annually Lab Results   Component Value Date     (H) 12/22/2023       Dilated Eye Exam Annually Last Diabetic Eye Exam:  No data recorded  No data recorded

## 2024-06-19 DIAGNOSIS — E11.9 TYPE 2 DIABETES MELLITUS WITHOUT COMPLICATION, WITHOUT LONG-TERM CURRENT USE OF INSULIN (HCC): ICD-10-CM

## 2024-06-20 DIAGNOSIS — E11.9 TYPE 2 DIABETES MELLITUS WITHOUT COMPLICATION, WITHOUT LONG-TERM CURRENT USE OF INSULIN (HCC): ICD-10-CM

## 2024-06-20 NOTE — TELEPHONE ENCOUNTER
Lexus Figueroa requesting Medication Refill for:    Medication name and dose (copy and paste from medication list):   Medication Quantity Refills Start End   Glucose Blood (ACCU-CHEK GUIDE) In Vitro Strip 100 strip 1 2024 --   Sig:   USE 1 STRIP TO CHECK BLOOD SUGARS  DAILY     Route:   (none)     Order #:   [id number]         If medication is not on medication list - transfer patient to RN queue for triage    Preferred Pharmacy:   [facility] - [facility] [address] [facility] [phone], [phone]   [address] [id number]   Phone: [phone] Fax: [phone]   Hours: Not open 24 hours       LOV: Visit date not found   Last Refill date: 24  Next Scheduled appointment: No future appointments.     Lexus Figueroa requesting Medication Refill for:    Medication name and dose (copy and paste from medication list):   Medication Quantity Refills Start End   Accu-Chek FastClix Lancets Does not apply Misc () 100 each 1 2021   Si lancet by Finger stick route daily. Use as directed.     Route:   Finger stick     Note to Pharmacy:   Dx: E11.9 pt non insulin dependent     Order #:   [id number]         If medication is not on medication list - transfer patient to RN queue for triage    Preferred Pharmacy:   [facility] - [facility] [address] [facility] [phone], [phone]   [address] [id number]   Phone: [phone] Fax: [phone]   Hours: Not open 24 hours       LOV: Visit date not found   Last Refill date: 21  Next Scheduled appointment: No future appointments.

## 2024-06-21 DIAGNOSIS — E11.9 TYPE 2 DIABETES MELLITUS WITHOUT COMPLICATION, WITHOUT LONG-TERM CURRENT USE OF INSULIN (HCC): ICD-10-CM

## 2024-06-21 RX ORDER — BLOOD SUGAR DIAGNOSTIC
STRIP MISCELLANEOUS
Qty: 100 STRIP | Refills: 3 | OUTPATIENT
Start: 2024-06-21

## 2024-06-21 RX ORDER — BLOOD SUGAR DIAGNOSTIC
STRIP MISCELLANEOUS
Qty: 100 STRIP | Refills: 3 | Status: SHIPPED | OUTPATIENT
Start: 2024-06-21

## 2024-06-21 NOTE — TELEPHONE ENCOUNTER
REFILL PASSED PER [facility] PROTOCOLS    Requested Prescriptions   Pending Prescriptions Disp Refills    Glucose Blood (ACCU-CHEK GUIDE) In Vitro Strip 100 strip 1     Sig: USE 1 STRIP TO CHECK BLOOD SUGARS  DAILY       Diabetic Supplies Protocol Passed - 6/19/2024 10:37 AM        Passed - In person appointment or virtual visit in the past 12 mos or appointment in next 3 mos     Recent Outpatient Visits              4 months ago Routine general medical examination at a health care facility    [facility], [address] [place], [place] [address]Lexus Israel APRN    Office Visit    1 year ago     [facility] - Jurgen Boone, PT    Office Visit    1 year ago     [facility] - Jurgen Boone, PT    Office Visit    1 year ago Pelvic pain    [facility] - Jurgen Boone, PT    Office Visit    2 years ago Encounter for annual health examination    [facility], [address] [place] [address] Lexus Messer APRN    Office Visit                             Recent Outpatient Visits              4 months ago Routine general medical examination at a health care facility    [facility], [address] [place] [address] Lexus Messer APRN    Office Visit    1 year ago     [facility] - Jurgen Boone, PT    Office Visit    1 year ago     [facility] - Jurgen Boone, PT    Office Visit    1 year ago Pelvic pain    [facility] - Jurgen Boone, PT    Office Visit    2 years ago Encounter for annual health examination    [facility], [address] [place] [address] Lexus Messer APRN    Office Visit

## 2024-06-24 DIAGNOSIS — E11.40 CONTROLLED TYPE 2 DIABETES WITH NEUROPATHY (HCC): ICD-10-CM

## 2024-06-24 NOTE — TELEPHONE ENCOUNTER
[facility] - [facility] [address] [facility] -[phone], [phone]     Patient stated she has a few days left of the below and needs a refill to go to the above pharmacy.         glimepiride 2 MG Oral Tab (Discontinued) 60 tablet 0 1/2/2024 2/7/2024   Sig:   Take 1 tablet (2 mg total) by mouth in the morning and 1 tablet (2 mg total) before bedtime.

## 2024-06-25 RX ORDER — GLIMEPIRIDE 2 MG/1
2 TABLET ORAL 2 TIMES DAILY
Qty: 60 TABLET | Refills: 11 | OUTPATIENT
Start: 2024-06-25 | End: 2024-07-25

## 2024-06-25 RX ORDER — GLIMEPIRIDE 2 MG/1
2 TABLET ORAL
Qty: 90 TABLET | Refills: 0 | Status: SHIPPED | OUTPATIENT
Start: 2024-06-25

## 2024-06-25 NOTE — TELEPHONE ENCOUNTER
Over due for labs and ov.  Please call to remind her to have labs completed and schedule ov  Uncontrolled diabetes

## 2024-06-25 NOTE — TELEPHONE ENCOUNTER
Please review. Protocol Failed; No Protocol    Please advise :  [facility] - [place], [place] - [address] [facility] [phone], [phone]      Patient stated she has a few days left of the below and needs a refill to go to the above pharmacy.              glimepiride 2 MG Oral Tab (Discontinued) 60 tablet 0 1/2/2024 2/7/2024   Sig:   Take 1 tablet (2 mg total) by mouth in the morning and 1 tablet (2 mg total) before bedtime.                   Requested Prescriptions   Pending Prescriptions Disp Refills    glimepiride 2 MG Oral Tab 90 tablet 0     Sig: Take 1 tablet (2 mg total) by mouth daily with breakfast.       Diabetes Medication Protocol Failed - 6/24/2024  3:57 PM        Failed - Last A1C < 7.5 and within past 6 months     Lab Results   Component Value Date    A1C 12.0 (H) 12/22/2023             Passed - In person appointment or virtual visit in the past 6 mos or appointment in next 3 mos     Recent Outpatient Visits              4 months ago Routine general medical examination at a health care facility    [facility], [address] [place] [address], Lexus Messer APRN    Office Visit    1 year ago     [facility] - Jurgen Boone, PT    Office Visit    1 year ago     [facility] - Jurgen Boone, PT    Office Visit    1 year ago Pelvic pain    [facility] - Jurgen Boone, PT    Office Visit    2 years ago Encounter for annual health examination    [facility], [address] [place] [address], Lexus Messer APRN    Office Visit                      Passed - Microalbumin procedure in past 12 months or taking ACE/ARB        Passed - EGFRCR or GFRNAA > 50     GFR Evaluation  EGFRCR: 64 , resulted on 12/22/2023          Passed - GFR in the past 12 months                 Recent Outpatient Visits              4 months ago Routine general medical examination at a health care facility    [facility], [address] [place] [address] Lexus Messer APRN    Office Visit    1 year ago     [facility] - Jurgen Boone, PT    Office Visit    1 year ago     [facility] - Jurgen Boone, PT    Office Visit    1 year ago Pelvic pain    [facility] - Jurgen Boone, PT    Office Visit    2 years ago Encounter for annual health examination    [facility], [address] [place] [address], Lexus Messer APRN    Office Visit

## 2024-06-25 NOTE — TELEPHONE ENCOUNTER
Called Patient and Patient stated that [employer] is no longer in her network. Patient stated also that she is going to call her insurance to verify.

## 2024-09-18 DIAGNOSIS — E11.40 CONTROLLED TYPE 2 DIABETES WITH NEUROPATHY (HCC): ICD-10-CM

## 2024-09-23 NOTE — TELEPHONE ENCOUNTER
Please review; protocol failed/ has no protocol      Message sent for patient to make an appointment.     Requested Prescriptions   Pending Prescriptions Disp Refills    GLIMEPIRIDE 2 MG Oral Tab [Pharmacy Med Name: Glimepiride Oral Tablet 2 MG] 90 tablet 3     Sig: TAKE 1 TABLET EVERY DAY WITH BREAKFAST       Diabetes Medication Protocol Failed - 9/18/2024 11:08 AM        Failed - Last A1C < 7.5 and within past 6 months     Lab Results   Component Value Date    A1C 12.0 (H) 12/22/2023             Failed - In person appointment or virtual visit in the past 6 mos or appointment in next 3 mos     Recent Outpatient Visits              7 months ago Routine general medical examination at a health care facility    [facility], [address] [place], [place] [address]Lexus Israel APRN    Office Visit    2 years ago     [facility] - Jurgen Boone, PT    Office Visit    2 years ago     [facility] - Jurgen Boone, PT    Office Visit    2 years ago Pelvic pain    [facility] - Jurgen Boone, PT    Office Visit    2 years ago Encounter for annual health examination    [address]    Office Visit                      Passed - Microalbumin procedure in past 12 months or taking ACE/ARB        Passed - EGFRCR or GFRNAA > 50     GFR Evaluation  EGFRCR: 64 , resulted on 12/22/2023          Passed - GFR in the past 12 months           Recent Outpatient Visits              7 months ago Routine general medical examination at a health care facility    [facility], [address] [place], [place] [address]Lexus Israel APRN    Office Visit    2 years ago     [facility] - Jurgen Boone, PT    Office Visit    2 years ago     [facility] - Jurgen Boone, PT    Office Visit    2 years ago Pelvic pain    [facility] - Jurgen Boone, PT     Office Visit    2 years ago Encounter for annual health examination    [facility], [address], Lexus Messer, KRISTIN    Office Visit

## 2024-09-24 NOTE — TELEPHONE ENCOUNTER
Patient agreed and started on basal insulin at last ov in Feb.  She is over due for labs and follow up .  Will order 30 days  sent to [facility]  ok to send to alternative local pharmacy if needed.   Needs to complete labs and schedule visit.  Is she taking basal insulin?

## 2024-09-24 NOTE — TELEPHONE ENCOUNTER
Attempted to call pt. Left voicemail to call back.     MCM sent yesterday by refill team has not been read

## 2024-09-27 NOTE — TELEPHONE ENCOUNTER
Left voicemail to call back and sent Predict[employer]t message with provider message.     Please advise on refill pended below for 30 day supply due to drug allergy alert.

## 2024-09-28 RX ORDER — GLIMEPIRIDE 2 MG/1
2 TABLET ORAL
Qty: 30 TABLET | Refills: 0 | Status: SHIPPED | OUTPATIENT
Start: 2024-09-28 | End: 2024-11-25

## 2024-11-19 DIAGNOSIS — E11.40 CONTROLLED TYPE 2 DIABETES WITH NEUROPATHY (HCC): ICD-10-CM

## 2024-11-25 RX ORDER — GLIMEPIRIDE 2 MG/1
2 TABLET ORAL
Qty: 90 TABLET | Refills: 0 | Status: SHIPPED | OUTPATIENT
Start: 2024-11-25

## 2024-11-25 NOTE — TELEPHONE ENCOUNTER
Please review; protocol failed    Patient is overdue for a follow up diabetic appointment.  Patient is also overdue for labs - orders were placed 6/25/24.       Patient last completed lab work was 12/22/2023    HubHubt message sent to patient to schedule an office visit and complete lab work.     Okay to give enough medication until her annual well exam? Does patient need to be sooner?  Future Appointments   Date Time Provider Department Center   2/12/2025 11:40 AM Elizabeth Eugene MD [facility]     Last office visit: 2/7/2024    Patient was provided with a 30 day supply last refill.     Requested Prescriptions   Pending Prescriptions Disp Refills    glimepiride 2 MG Oral Tab [Pharmacy Med Name: Glimepiride Oral Tablet 2 MG] 15 tablet 0     Sig: Take 1 tablet (2 mg total) by mouth daily with breakfast. **15 day refill given, overdue for lab work and follow up appointment - No further refills.       Diabetes Medication Protocol Failed - 11/25/2024  9:38 AM        Failed - Last A1C < 7.5 and within past 6 months     Lab Results   Component Value Date    A1C 12.0 (H) 12/22/2023             Passed - In person appointment or virtual visit in the past 6 mos or appointment in next 3 mos     Recent Outpatient Visits              9 months ago Routine general medical examination at a health care facility    [facility], [address] [place], [place] [address]Lexus Israel APRN    Office Visit    2 years ago     [facility] - Jurgen Boone, PT    Office Visit    2 years ago     [facility] - Jurgen Boone, PT    Office Visit    2 years ago Pelvic pain    [facility] - Jurgen Boone, PT    Office Visit    2 years ago Encounter for annual health examination    [facility], [address] [place], [place] [address]Lexus Israel APRN    Office Visit          Future Appointments         Provider Department Appt Notes    In 2 months Elizabeth Eugene,  MD [facility], [address] [place] [address]. Last Cpe 2/7/24                    Passed - Microalbumin procedure in past 12 months or taking ACE/ARB        Passed - EGFRCR or GFRNAA > 50     GFR Evaluation  EGFRCR: 64 , resulted on 12/22/2023          Passed - GFR in the past 12 months             Future Appointments         Provider Department Appt Notes    In 2 months Elizabeth Eugene MD [facility], [address] [place] [address]. Last Cpe 2/7/24          Recent Outpatient Visits              9 months ago Routine general medical examination at a health care facility    [facility], [address] [place], [place] [address]Lexus Israel APRN    Office Visit    2 years ago     [facility] - Jurgen Boone, PT    Office Visit    2 years ago     [facility] - Jurgen Boone, PT    Office Visit    2 years ago Pelvic pain    [facility] - Jurgen Boone, PT    Office Visit    2 years ago Encounter for annual health examination    [facility], [address] [place], [place] [address]Lexus Israel APRN    Office Visit

## 2024-11-25 NOTE — TELEPHONE ENCOUNTER
Per office visit with KRISTNI Lopes 2/7/24:  Diabetes.  Glimepiride 2mg.  Metformin  both caused unwanted side effects so she has stopped.  Not taking any medications for her diabetes.  Glucose 271with A1c 12.0 from 8.5 in 6.22.  discussed options.  Finally opted to try long acting insulin for less side effects     Per refill encounter 9/18/24:  Lexus Lopes APRN     9/24/24  1:28 PM   Patient agreed and started on basal insulin at last ov in Feb.  She is over due for labs and follow up .  Will order 30 days sent to [facility]  ok to send to alternative local pharmacy if needed.   Needs to complete labs and schedule visit.  Is she taking basal insulin?

## 2025-01-21 ENCOUNTER — TELEPHONE (OUTPATIENT)
Dept: INTERNAL MEDICINE CLINIC | Facility: CLINIC | Age: 70
End: 2025-01-21

## 2025-01-21 NOTE — TELEPHONE ENCOUNTER
Unable to reach patient to discuss statin use in diabetes. LVM for patient to call me back at [phone].

## 2025-03-02 ENCOUNTER — TELEPHONE (OUTPATIENT)
Dept: INTERNAL MEDICINE CLINIC | Facility: CLINIC | Age: 70
End: 2025-03-02

## 2025-03-04 ENCOUNTER — TELEPHONE (OUTPATIENT)
Dept: INTERNAL MEDICINE CLINIC | Facility: CLINIC | Age: 70
End: 2025-03-04

## 2025-03-04 NOTE — TELEPHONE ENCOUNTER
Unable to reach patient for medication adherence consult. LVM for patient to call me back at [phone].

## 2025-06-04 ENCOUNTER — TELEPHONE (OUTPATIENT)
Dept: INTERNAL MEDICINE CLINIC | Facility: CLINIC | Age: 70
End: 2025-06-04

## 2025-06-04 ENCOUNTER — MED REC SCAN ONLY (OUTPATIENT)
Dept: INTERNAL MEDICINE CLINIC | Facility: CLINIC | Age: 70
End: 2025-06-04

## 2025-06-04 DIAGNOSIS — H53.9 VISION CHANGES: Primary | ICD-10-CM

## 2025-06-04 DIAGNOSIS — E11.65 UNCONTROLLED TYPE 2 DIABETES MELLITUS WITH HYPERGLYCEMIA (HCC): ICD-10-CM

## 2025-06-04 NOTE — TELEPHONE ENCOUNTER
Referral has been placed.   Last ov here was 2/24.  I will not be able to provide her with further referrals until she sees us in the office with updated labs.    Uncontrolled DM with last A1c 12/23 12.0   asked to f/u 3 mo and has not had completed.  Will re order labs to complete.

## 2025-06-04 NOTE — TELEPHONE ENCOUNTER
Referral Request received from [facility]. Placed in SD bin for review and to address.

## 2025-07-01 ENCOUNTER — TELEPHONE (OUTPATIENT)
Dept: INTERNAL MEDICINE CLINIC | Facility: CLINIC | Age: 70
End: 2025-07-01

## 2025-07-01 NOTE — TELEPHONE ENCOUNTER
Received Dilated eye report from DR. Garg office. Encounter date was 6/27/25. The report showed no retinopathy. Abstracted report. Placed in SD bin for review.

## 2025-07-10 ENCOUNTER — TELEPHONE (OUTPATIENT)
Dept: INTERNAL MEDICINE CLINIC | Facility: CLINIC | Age: 70
End: 2025-07-10

## 2025-07-10 DIAGNOSIS — Z12.83 SKIN EXAM FOR MALIGNANT NEOPLASM: Primary | ICD-10-CM

## 2025-07-10 NOTE — TELEPHONE ENCOUNTER
Patient called request labs prior to their annual physical.  Annual physical scheduled for   Future Appointments   Date Time Provider Department Center   9/15/2025  5:40 PM Elizabeth Eugene MD [facility]     .   Please order labs. Patient preferred lab is [facility]  Patient informed request was sent to clinical team.  Patient informed to fast for labs.  No callback required.

## 2025-07-10 NOTE — TELEPHONE ENCOUNTER
Patient would like a referral for dermatology asap before her upcoming physical.    Future Appointments   Date Time Provider Department Center   9/15/2025  5:40 PM Elizabeth Eugene MD [facility]

## 2025-07-10 NOTE — TELEPHONE ENCOUNTER
Received call back from pt. She is needing derm referral for skin screening, seeing Dr. Santos.     Referral pended.     Triage- pt asked for call back when referral has been placed.

## 2025-07-10 NOTE — TELEPHONE ENCOUNTER
LEFT MESSAGE for pt at  [phone] (M) voicemail to ask the reason for the referral which has to be on the referral itself.

## 2025-07-11 NOTE — TELEPHONE ENCOUNTER
Patient returned called, advised referral was signed however take about 3-5 business days for authorization

## 2025-08-19 ENCOUNTER — PATIENT OUTREACH (OUTPATIENT)
Dept: INTERNAL MEDICINE CLINIC | Facility: CLINIC | Age: 70
End: 2025-08-19

## (undated) DIAGNOSIS — E11.40 CONTROLLED TYPE 2 DIABETES WITH NEUROPATHY (HCC): ICD-10-CM

## (undated) NOTE — LETTER
11/06/20        [place] [id number]      Dear Bisi Galarza,    [facility] records indicate that you have outstanding lab work and or testing that was ordered for you and has not yet been completed:  Orders Placed This Encounter      L

## (undated) NOTE — LETTER
9/30/2024          Lexus Figueroa        [address] [place] [address]                 Dear Lexus,    This letter is to inform you that our office has made several attempts to reach you by phone without success.  We were attempting to contact you by phone regarding prescription request.     Please contact our office at the number listed below as soon as you receive this letter to discuss this issue and to make the necessary changes in our system to your contact information.  Thank you for your cooperation.        Sincerely,    KRISTIN Carmichael  [facility], [address] [place] [id number] [phone]

## (undated) NOTE — LETTER
06/25/20        Ignacio Jaffe [id number]      Dear [place] Galicia,    [facility] records indicate that you have outstanding lab work and or testing that was ordered for you and has not yet been completed:  Orders Placed This Encounter      H

## (undated) NOTE — ED AVS SNAPSHOT
Priti Lucas   MRN: [id number]    Department:  [facility] Emergency Department   Date of Visit:  6/11/2018           Disclosure     Insurance plans vary and the physician(s) referred by the ER may not be covered by your plan.  Please contact y tell this physician (or your personal doctor if your instructions are to return to your personal doctor) about any new or lasting problems. The primary care or specialist physician will see patients referred from the [facility] Emergency Department.  Jeff Paniagua

## (undated) NOTE — LETTER
02/10/21        [address] [place] [id number]      Dear Sandrine Dunn,    [facility] records indicate that you have outstanding lab work and or testing that was ordered for you and has not yet been completed:  Orders Placed This Encounter

## (undated) NOTE — LETTER
11/09/18      Ian Diaz [id number]      Dear Matheus Maki: Our office staff has made several attempts to contact you via telephone and mychart.  It is in our best judgment for your health and well-being that you contact our off

## (undated) NOTE — ED AVS SNAPSHOT
Michelle Jackson   MRN: [id number]    Department:  [facility] Emergency Department   Date of Visit:  10/6/2019           Disclosure     Insurance plans vary and the physician(s) referred by the ER may not be covered by your plan.  Please contact y tell this physician (or your personal doctor if your instructions are to return to your personal doctor) about any new or lasting problems. The primary care or specialist physician will see patients referred from the [facility] Emergency Department.  Darlene Dalton